# Patient Record
Sex: FEMALE | Race: WHITE
[De-identification: names, ages, dates, MRNs, and addresses within clinical notes are randomized per-mention and may not be internally consistent; named-entity substitution may affect disease eponyms.]

---

## 2018-01-07 ENCOUNTER — HOSPITAL ENCOUNTER (OUTPATIENT)
Dept: HOSPITAL 84 - D.ER | Age: 73
Setting detail: OBSERVATION
LOS: 1 days | Discharge: HOME | End: 2018-01-08
Attending: FAMILY MEDICINE | Admitting: FAMILY MEDICINE
Payer: MEDICARE

## 2018-01-07 VITALS — DIASTOLIC BLOOD PRESSURE: 53 MMHG | SYSTOLIC BLOOD PRESSURE: 129 MMHG

## 2018-01-07 VITALS — DIASTOLIC BLOOD PRESSURE: 52 MMHG | SYSTOLIC BLOOD PRESSURE: 151 MMHG

## 2018-01-07 VITALS — DIASTOLIC BLOOD PRESSURE: 60 MMHG | SYSTOLIC BLOOD PRESSURE: 132 MMHG

## 2018-01-07 VITALS — SYSTOLIC BLOOD PRESSURE: 134 MMHG | DIASTOLIC BLOOD PRESSURE: 58 MMHG

## 2018-01-07 VITALS — DIASTOLIC BLOOD PRESSURE: 49 MMHG | SYSTOLIC BLOOD PRESSURE: 134 MMHG

## 2018-01-07 VITALS — BODY MASS INDEX: 26.29 KG/M2 | BODY MASS INDEX: 26.29 KG/M2 | WEIGHT: 154 LBS | HEIGHT: 64 IN

## 2018-01-07 VITALS — DIASTOLIC BLOOD PRESSURE: 58 MMHG | SYSTOLIC BLOOD PRESSURE: 133 MMHG

## 2018-01-07 DIAGNOSIS — K21.9: ICD-10-CM

## 2018-01-07 DIAGNOSIS — E78.5: ICD-10-CM

## 2018-01-07 DIAGNOSIS — F32.89: ICD-10-CM

## 2018-01-07 DIAGNOSIS — G25.0: ICD-10-CM

## 2018-01-07 DIAGNOSIS — I10: ICD-10-CM

## 2018-01-07 DIAGNOSIS — J44.1: Primary | ICD-10-CM

## 2018-01-07 LAB
ALBUMIN SERPL-MCNC: 3.7 G/DL (ref 3.4–5)
ALP SERPL-CCNC: 92 U/L (ref 46–116)
ALT SERPL-CCNC: 24 U/L (ref 10–68)
ANION GAP SERPL CALC-SCNC: 16.5 MMOL/L (ref 8–16)
BASOPHILS NFR BLD AUTO: 0.4 % (ref 0–2)
BILIRUB SERPL-MCNC: 0.27 MG/DL (ref 0.2–1.3)
BUN SERPL-MCNC: 26 MG/DL (ref 7–18)
CALCIUM SERPL-MCNC: 8.6 MG/DL (ref 8.5–10.1)
CHLORIDE SERPL-SCNC: 104 MMOL/L (ref 98–107)
CO2 SERPL-SCNC: 26 MMOL/L (ref 21–32)
CREAT SERPL-MCNC: 1.8 MG/DL (ref 0.6–1.3)
EOSINOPHIL NFR BLD: 0.7 % (ref 0–7)
ERYTHROCYTE [DISTWIDTH] IN BLOOD BY AUTOMATED COUNT: 14 % (ref 11.5–14.5)
GLOBULIN SER-MCNC: 3.6 G/L
GLUCOSE SERPL-MCNC: 106 MG/DL (ref 74–106)
HCT VFR BLD CALC: 39.3 % (ref 36–48)
HGB BLD-MCNC: 12.9 G/DL (ref 12–16)
IMM GRANULOCYTES NFR BLD: 0.3 % (ref 0–5)
LYMPHOCYTES NFR BLD AUTO: 14.6 % (ref 15–50)
MCH RBC QN AUTO: 28.8 PG (ref 26–34)
MCHC RBC AUTO-ENTMCNC: 32.8 G/DL (ref 31–37)
MCV RBC: 87.7 FL (ref 80–100)
MONOCYTES NFR BLD: 13.8 % (ref 2–11)
NEUTROPHILS NFR BLD AUTO: 70.2 % (ref 40–80)
NT-PROBNP SERPL-MCNC: 167 PG/ML (ref 0–125)
OSMOLALITY SERPL CALC.SUM OF ELEC: 289 MOSM/KG (ref 275–300)
PLATELET # BLD: 189 10X3/UL (ref 130–400)
PMV BLD AUTO: 10.2 FL (ref 7.4–10.4)
POTASSIUM SERPL-SCNC: 3.5 MMOL/L (ref 3.5–5.1)
PROT SERPL-MCNC: 7.3 G/DL (ref 6.4–8.2)
RBC # BLD AUTO: 4.48 10X6/UL (ref 4–5.4)
SODIUM SERPL-SCNC: 143 MMOL/L (ref 136–145)
TROPONIN I SERPL-MCNC: < 0.017 NG/ML (ref 0–0.06)
WBC # BLD AUTO: 6.7 10X3/UL (ref 4.8–10.8)

## 2018-01-08 VITALS — DIASTOLIC BLOOD PRESSURE: 54 MMHG | SYSTOLIC BLOOD PRESSURE: 141 MMHG

## 2018-01-08 VITALS — SYSTOLIC BLOOD PRESSURE: 129 MMHG | DIASTOLIC BLOOD PRESSURE: 55 MMHG

## 2018-01-08 VITALS — DIASTOLIC BLOOD PRESSURE: 58 MMHG | SYSTOLIC BLOOD PRESSURE: 148 MMHG

## 2018-01-08 VITALS — SYSTOLIC BLOOD PRESSURE: 159 MMHG | DIASTOLIC BLOOD PRESSURE: 59 MMHG

## 2018-01-08 VITALS — DIASTOLIC BLOOD PRESSURE: 59 MMHG | SYSTOLIC BLOOD PRESSURE: 126 MMHG

## 2019-08-09 ENCOUNTER — HOSPITAL ENCOUNTER (INPATIENT)
Dept: HOSPITAL 84 - D.ER | Age: 74
LOS: 4 days | Discharge: NURSING, REHAB OR LONG-TERM CARE | DRG: 493 | End: 2019-08-13
Attending: FAMILY MEDICINE | Admitting: FAMILY MEDICINE
Payer: MEDICARE

## 2019-08-09 VITALS — HEIGHT: 64 IN | BODY MASS INDEX: 27.2 KG/M2 | WEIGHT: 159.33 LBS

## 2019-08-09 VITALS — DIASTOLIC BLOOD PRESSURE: 63 MMHG | SYSTOLIC BLOOD PRESSURE: 138 MMHG

## 2019-08-09 DIAGNOSIS — I10: ICD-10-CM

## 2019-08-09 DIAGNOSIS — I25.10: ICD-10-CM

## 2019-08-09 DIAGNOSIS — J44.9: ICD-10-CM
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
## 2019-08-09 DIAGNOSIS — K21.9: ICD-10-CM
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
## 2019-08-09 DIAGNOSIS — S82.142A: Primary | ICD-10-CM
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
## 2019-08-09 DIAGNOSIS — D62: ICD-10-CM

## 2019-08-09 LAB
ALBUMIN SERPL-MCNC: 3.7 G/DL (ref 3.4–5)
ALP SERPL-CCNC: 102 U/L (ref 46–116)
ALT SERPL-CCNC: 53 U/L (ref 10–68)
ANION GAP SERPL CALC-SCNC: 11.2 MMOL/L (ref 8–16)
APTT BLD: 25.5 SECONDS (ref 22.8–39.4)
BASOPHILS NFR BLD AUTO: 0.4 % (ref 0–2)
BILIRUB SERPL-MCNC: 0.5 MG/DL (ref 0.2–1.3)
BUN SERPL-MCNC: 38 MG/DL (ref 7–18)
CALCIUM SERPL-MCNC: 9 MG/DL (ref 8.5–10.1)
CHLORIDE SERPL-SCNC: 105 MMOL/L (ref 98–107)
CO2 SERPL-SCNC: 27.5 MMOL/L (ref 21–32)
CREAT SERPL-MCNC: 1.7 MG/DL (ref 0.6–1.3)
EOSINOPHIL NFR BLD: 1.1 % (ref 0–7)
ERYTHROCYTE [DISTWIDTH] IN BLOOD BY AUTOMATED COUNT: 14.4 % (ref 11.5–14.5)
GLOBULIN SER-MCNC: 3.1 G/L
GLUCOSE SERPL-MCNC: 94 MG/DL (ref 74–106)
HCT VFR BLD CALC: 34.5 % (ref 36–48)
HGB BLD-MCNC: 11.3 G/DL (ref 12–16)
IMM GRANULOCYTES NFR BLD: 0.3 % (ref 0–5)
INR PPP: 1.02 (ref 0.85–1.17)
LYMPHOCYTES NFR BLD AUTO: 16.2 % (ref 15–50)
MCH RBC QN AUTO: 29.1 PG (ref 26–34)
MCHC RBC AUTO-ENTMCNC: 32.8 G/DL (ref 31–37)
MCV RBC: 88.9 FL (ref 80–100)
MONOCYTES NFR BLD: 8.1 % (ref 2–11)
NEUTROPHILS NFR BLD AUTO: 73.9 % (ref 40–80)
OSMOLALITY SERPL CALC.SUM OF ELEC: 287 MOSM/KG (ref 275–300)
PLATELET # BLD: 195 10X3/UL (ref 130–400)
PMV BLD AUTO: 9.7 FL (ref 7.4–10.4)
POTASSIUM SERPL-SCNC: 3.7 MMOL/L (ref 3.5–5.1)
PROT SERPL-MCNC: 6.8 G/DL (ref 6.4–8.2)
PROTHROMBIN TIME: 12.9 SECONDS (ref 11.6–15)
RBC # BLD AUTO: 3.88 10X6/UL (ref 4–5.4)
SODIUM SERPL-SCNC: 140 MMOL/L (ref 136–145)
WBC # BLD AUTO: 11.1 10X3/UL (ref 4.8–10.8)

## 2019-08-10 VITALS — SYSTOLIC BLOOD PRESSURE: 118 MMHG | DIASTOLIC BLOOD PRESSURE: 68 MMHG

## 2019-08-10 VITALS — SYSTOLIC BLOOD PRESSURE: 120 MMHG | DIASTOLIC BLOOD PRESSURE: 50 MMHG

## 2019-08-10 VITALS — DIASTOLIC BLOOD PRESSURE: 48 MMHG | SYSTOLIC BLOOD PRESSURE: 124 MMHG

## 2019-08-10 VITALS — DIASTOLIC BLOOD PRESSURE: 50 MMHG | SYSTOLIC BLOOD PRESSURE: 129 MMHG

## 2019-08-10 LAB
ALBUMIN SERPL-MCNC: 3.4 G/DL (ref 3.4–5)
ALP SERPL-CCNC: 98 U/L (ref 46–116)
ALT SERPL-CCNC: 57 U/L (ref 10–68)
ANION GAP SERPL CALC-SCNC: 16.4 MMOL/L (ref 8–16)
BASOPHILS NFR BLD AUTO: 0.2 % (ref 0–2)
BILIRUB SERPL-MCNC: 0.73 MG/DL (ref 0.2–1.3)
BUN SERPL-MCNC: 30 MG/DL (ref 7–18)
CALCIUM SERPL-MCNC: 8.7 MG/DL (ref 8.5–10.1)
CHLORIDE SERPL-SCNC: 106 MMOL/L (ref 98–107)
CO2 SERPL-SCNC: 21.8 MMOL/L (ref 21–32)
CREAT SERPL-MCNC: 1.4 MG/DL (ref 0.6–1.3)
EOSINOPHIL NFR BLD: 0.7 % (ref 0–7)
ERYTHROCYTE [DISTWIDTH] IN BLOOD BY AUTOMATED COUNT: 14.3 % (ref 11.5–14.5)
GLOBULIN SER-MCNC: 3.2 G/L
GLUCOSE SERPL-MCNC: 114 MG/DL (ref 74–106)
HCT VFR BLD CALC: 33.4 % (ref 36–48)
HGB BLD-MCNC: 10.9 G/DL (ref 12–16)
IMM GRANULOCYTES NFR BLD: 0.2 % (ref 0–5)
LYMPHOCYTES NFR BLD AUTO: 15.5 % (ref 15–50)
MCH RBC QN AUTO: 28.8 PG (ref 26–34)
MCHC RBC AUTO-ENTMCNC: 32.6 G/DL (ref 31–37)
MCV RBC: 88.1 FL (ref 80–100)
MONOCYTES NFR BLD: 10.7 % (ref 2–11)
NEUTROPHILS NFR BLD AUTO: 72.7 % (ref 40–80)
OSMOLALITY SERPL CALC.SUM OF ELEC: 285 MOSM/KG (ref 275–300)
PLATELET # BLD: 192 10X3/UL (ref 130–400)
PMV BLD AUTO: 10 FL (ref 7.4–10.4)
POTASSIUM SERPL-SCNC: 4.2 MMOL/L (ref 3.5–5.1)
PROT SERPL-MCNC: 6.6 G/DL (ref 6.4–8.2)
RBC # BLD AUTO: 3.79 10X6/UL (ref 4–5.4)
SODIUM SERPL-SCNC: 140 MMOL/L (ref 136–145)
WBC # BLD AUTO: 8.9 10X3/UL (ref 4.8–10.8)

## 2019-08-10 PROCEDURE — 0QSH04Z REPOSITION LEFT TIBIA WITH INTERNAL FIXATION DEVICE, OPEN APPROACH: ICD-10-PCS | Performed by: ORTHOPAEDIC SURGERY

## 2019-08-10 NOTE — NUR
PT VERY UPSET EARLIER AND STATED SHE WAS NOT GETTING ANY RELIEF FROM HER
MORPHINE PCA. AFTER ONE HOUR OF NO RELIEF WE CALL [facility] FOR DR SEGAL AND SHE
DC'D THE MORPHINE PCA AND ORDERED DILAUDID IV EVERY 6 HRS. WHEN THIS WAS TOLD
TO THE PATIENT SHE STATED THAT THE MORPHINE HAD FINALLY KICKED IN AND SHE WAS
DOING BETTER AND DID NOT WANT TO CHANGE HER MEDS AT THIS TIME. WILL LET THE MD
KNOW IN THE AM.

## 2019-08-10 NOTE — NUR
RECEIVED PT FROM OR VIA STRETCHER. DROWSY. RESP EVEN AND NONLABORED. O2 @
3LNC. BED ALARM ON FOR PT SAFETY. DRSG NOTED TO LLE WITH IMMOBILIZER IN USE.
LT PEDAL PULSE WEAKER THATN RT. 1/2 NS @ 50 ML/HR INFUSINGIN RT WRIST.
MORPHINE PCA IN USE. V/S STABLE. SR ELEVATED X2. CL IN REACH.

## 2019-08-11 VITALS — SYSTOLIC BLOOD PRESSURE: 115 MMHG | DIASTOLIC BLOOD PRESSURE: 34 MMHG

## 2019-08-11 VITALS — DIASTOLIC BLOOD PRESSURE: 38 MMHG | SYSTOLIC BLOOD PRESSURE: 97 MMHG

## 2019-08-11 VITALS — DIASTOLIC BLOOD PRESSURE: 42 MMHG | SYSTOLIC BLOOD PRESSURE: 116 MMHG

## 2019-08-11 VITALS — SYSTOLIC BLOOD PRESSURE: 114 MMHG | DIASTOLIC BLOOD PRESSURE: 54 MMHG

## 2019-08-11 VITALS — SYSTOLIC BLOOD PRESSURE: 109 MMHG | DIASTOLIC BLOOD PRESSURE: 63 MMHG

## 2019-08-11 VITALS — DIASTOLIC BLOOD PRESSURE: 78 MMHG | SYSTOLIC BLOOD PRESSURE: 128 MMHG

## 2019-08-11 LAB
ALBUMIN SERPL-MCNC: 2.8 G/DL (ref 3.4–5)
ALP SERPL-CCNC: 84 U/L (ref 46–116)
ALT SERPL-CCNC: 45 U/L (ref 10–68)
ANION GAP SERPL CALC-SCNC: 13.8 MMOL/L (ref 8–16)
BASOPHILS NFR BLD AUTO: 0 % (ref 0–2)
BILIRUB SERPL-MCNC: 0.61 MG/DL (ref 0.2–1.3)
BUN SERPL-MCNC: 26 MG/DL (ref 7–18)
CALCIUM SERPL-MCNC: 8.2 MG/DL (ref 8.5–10.1)
CHLORIDE SERPL-SCNC: 106 MMOL/L (ref 98–107)
CO2 SERPL-SCNC: 25.8 MMOL/L (ref 21–32)
CREAT SERPL-MCNC: 1.5 MG/DL (ref 0.6–1.3)
EOSINOPHIL NFR BLD: 0 % (ref 0–7)
ERYTHROCYTE [DISTWIDTH] IN BLOOD BY AUTOMATED COUNT: 14.5 % (ref 11.5–14.5)
GLOBULIN SER-MCNC: 3.2 G/L
GLUCOSE SERPL-MCNC: 114 MG/DL (ref 74–106)
HCT VFR BLD CALC: 31.3 % (ref 36–48)
HGB BLD-MCNC: 10.1 G/DL (ref 12–16)
IMM GRANULOCYTES NFR BLD: 0.2 % (ref 0–5)
LYMPHOCYTES NFR BLD AUTO: 5.8 % (ref 15–50)
MCH RBC QN AUTO: 28.8 PG (ref 26–34)
MCHC RBC AUTO-ENTMCNC: 32.3 G/DL (ref 31–37)
MCV RBC: 89.2 FL (ref 80–100)
MONOCYTES NFR BLD: 8.7 % (ref 2–11)
NEUTROPHILS NFR BLD AUTO: 85.3 % (ref 40–80)
OSMOLALITY SERPL CALC.SUM OF ELEC: 286 MOSM/KG (ref 275–300)
PLATELET # BLD: 200 10X3/UL (ref 130–400)
PMV BLD AUTO: 10.2 FL (ref 7.4–10.4)
POTASSIUM SERPL-SCNC: 4.6 MMOL/L (ref 3.5–5.1)
PROT SERPL-MCNC: 6 G/DL (ref 6.4–8.2)
RBC # BLD AUTO: 3.51 10X6/UL (ref 4–5.4)
SODIUM SERPL-SCNC: 141 MMOL/L (ref 136–145)
WBC # BLD AUTO: 9.4 10X3/UL (ref 4.8–10.8)

## 2019-08-11 NOTE — NUR
PT ALERT AND ORIENTED WHEN ENTERING THE ROOM. LEFT LOWER LEG WITH CAST AND
IMMOBILIZER. IN PLACE. PILLOW UNDER AND STABLIZED. STATES PAIN IS TOLERABLE AT
THIS TIME. HAS LEFT WRIST IV THAT IS PATENT AND INFUSING 0.45 NS AS ORDERED.
ASSISTED WITH BED AIRTA. PT HAS TRAPEZE BAR AND USES WELL. DENIES FURTHER
ISSUES. CPOC

## 2019-08-11 NOTE — NUR
PT RESTING IN BED. CO OF PAIN 10/10. OXY 10 MG GIVEN FOR PAIN. NO S/S OF ACUTE
DISTRESS. CL IN PLACE.

## 2019-08-11 NOTE — OP
PATIENT NAME:  MARTELL WATSON                               MEDICAL RECORD: [id number]
:[birth year]                                             LOCATION:D.MS [id number]
                                                         ADMISSION DATE:19
SURGEON:  RICCI BATES DO         
 
 
DATE OF OPERATION:  08/10/2019
 
PROCEDURE PERFORMED:  Left lateral tibial plateau open reduction internal
fixation.
 
PREOPERATIVE DIAGNOSIS:  Displaced split depression of the left lateral tibial
plateau.
 
POSTOPERATIVE DIAGNOSIS:  Displaced split depression of the left lateral tibial
plateau.
 
INDICATIONS:  Ms. Watson is a 73-year-old female who was cleaning her ceiling
fan from her bed.  She slipped and fell and sustained a tibial plateau fracture.
 She was admitted to Medicine, [facility] overnight and she was aware of the
risks of this including infection, bleeding, damage to nerves and vessels,
failure of fixation, malunion, nonunion, and continued knee pain.  She is also
aware of the fact that she may most likely need a knee replacement due to the
traumatic nature of this to the joint line.  She was aware that as well as the
risk of blood clots and even death and signed the consent.
 
SURGEON:  Ricci Bates DO
 
PROCEDURE:  The patient was taken to the operative suite, laid in supine
position, given general anesthetic and 2 grams of Ancef preoperatively.  The
left lower extremity was then prepped and draped in sterile fashion.  Tourniquet
was under the drapes.  A timeout was performed and everyone was agreeance with
the correct side, site, patient and procedure.  The Esmarch was then used to
exsanguinate the left lower extremity.  The tourniquet was inflated to 350 mmHg,
was up for 70 minutes.  The incision then began over the lateral aspect of the
tibia in a hockey stick type incision and careful dissection was made down to
the IT band.  The IT band was split and the tibia was cleaned off for a place
for the plate.  Reduction was made and tamp was used to tamp up the depressed
part of the tibial plateau on the lateral side.  Once this was completed, the
plate was placed, and once he is in satisfactory position, AP and lateral screws
were entered first a kickstand screw and then proximal screws and then more
distal screws and the kickstand screw was replaced for a locking screw.  This
was confirmed to be in good position on AP and lateral x-ray.  The tourniquet
was then let down and bleeding was coagulated.  The incision was then closed. 
The deep tissue, fascia was closed with #1 Vicryl in a figure-of-eight fashion
and then the skin was closed with 2-0 Vicryl in inverted interrupted fashion and
ZipLine was placed on the knee.  Adaptic, 4 x 4s, ABD, Webril, and Ace wrap was
then used to dress the wound.  A ANGIE hose stocking up to the knee and the knee
immobilizer placed on the patient.  She was awakened and taken to recovery in
stable condition.  Blood loss approximately 100 mL.
 
COMPLICATIONS:  None.
 
TRANSINT:[id number] Voice Confirmation ID: [id number] DOCUMENT ID: [id number]
 
 
 
OPERATIVE REPORT                               [id number]    MARTELL WATSON,RICCI LIU DO         
 
 
 
Electronically Signed by RICCI LEAVITT on 19 at 0701
 
 
 
 
 
 
 
 
 
 
 
 
 
 
 
 
 
 
 
 
 
 
 
 
 
 
 
 
 
 
 
 
 
 
 
 
 
 
 
 
 
CC:                                                             [id number]
DICTATION DATE: 08/10/19 2042     :     08/10/19 2315      ADM IN  
                                                                              
[facility]                                          
[address] [place] [address]

## 2019-08-11 NOTE — NUR
ASSISTED ONTO BEDPAN TO VOID. DOING WELL. USED PCA FOR C/O PAIN IN LLE. V/S
STABLE. RESP EVEN AND NONLABORED. BED ALARM IN USE FOR PT SAFETY. CL IN REACH.

## 2019-08-11 NOTE — NUR
PT RESTING IN BED. WATCHING TV. PAIN 2/10. PCA EDUCATION DONE. NO S/S OF ACUTE
DISTRESS. CL IN PLACE.

## 2019-08-12 VITALS — DIASTOLIC BLOOD PRESSURE: 48 MMHG | SYSTOLIC BLOOD PRESSURE: 102 MMHG

## 2019-08-12 VITALS — SYSTOLIC BLOOD PRESSURE: 148 MMHG | DIASTOLIC BLOOD PRESSURE: 52 MMHG

## 2019-08-12 VITALS — SYSTOLIC BLOOD PRESSURE: 100 MMHG | DIASTOLIC BLOOD PRESSURE: 46 MMHG

## 2019-08-12 VITALS — SYSTOLIC BLOOD PRESSURE: 108 MMHG | DIASTOLIC BLOOD PRESSURE: 46 MMHG

## 2019-08-12 VITALS — DIASTOLIC BLOOD PRESSURE: 52 MMHG | SYSTOLIC BLOOD PRESSURE: 135 MMHG

## 2019-08-12 VITALS — SYSTOLIC BLOOD PRESSURE: 105 MMHG | DIASTOLIC BLOOD PRESSURE: 70 MMHG

## 2019-08-12 LAB
ALBUMIN SERPL-MCNC: 2.6 G/DL (ref 3.4–5)
ALP SERPL-CCNC: 81 U/L (ref 46–116)
ALT SERPL-CCNC: 24 U/L (ref 10–68)
ANION GAP SERPL CALC-SCNC: 9.2 MMOL/L (ref 8–16)
APPEARANCE UR: CLEAR
BACTERIA #/AREA URNS HPF: (no result) /HPF
BASOPHILS NFR BLD AUTO: 0.2 % (ref 0–2)
BILIRUB SERPL-MCNC: 0.48 MG/DL (ref 0.2–1.3)
BILIRUB SERPL-MCNC: NEGATIVE MG/DL
BUN SERPL-MCNC: 23 MG/DL (ref 7–18)
CALCIUM SERPL-MCNC: 7.5 MG/DL (ref 8.5–10.1)
CHLORIDE SERPL-SCNC: 106 MMOL/L (ref 98–107)
CO2 SERPL-SCNC: 28.3 MMOL/L (ref 21–32)
COLOR UR: YELLOW
CREAT SERPL-MCNC: 1.4 MG/DL (ref 0.6–1.3)
EOSINOPHIL NFR BLD: 1.1 % (ref 0–7)
ERYTHROCYTE [DISTWIDTH] IN BLOOD BY AUTOMATED COUNT: 14.4 % (ref 11.5–14.5)
FERRITIN SERPL-MCNC: 201 NG/ML (ref 3–244)
GLOBULIN SER-MCNC: 3.1 G/L
GLUCOSE SERPL-MCNC: 91 MG/DL (ref 74–106)
GLUCOSE SERPL-MCNC: NEGATIVE MG/DL
HCT VFR BLD CALC: 27.8 % (ref 36–48)
HGB BLD-MCNC: 9 G/DL (ref 12–16)
IMM GRANULOCYTES NFR BLD: 0.5 % (ref 0–5)
IRON SERPL-MCNC: 15 UG/DL (ref 35–150)
KETONES UR STRIP-MCNC: NEGATIVE MG/DL
LDH1 SERPL-CCNC: 194 U/L (ref 81–234)
LYMPHOCYTES NFR BLD AUTO: 13.9 % (ref 15–50)
MCH RBC QN AUTO: 28.9 PG (ref 26–34)
MCHC RBC AUTO-ENTMCNC: 32.4 G/DL (ref 31–37)
MCV RBC: 89.4 FL (ref 80–100)
MONOCYTES NFR BLD: 11.2 % (ref 2–11)
NEUTROPHILS NFR BLD AUTO: 73.1 % (ref 40–80)
NITRITE UR-MCNC: NEGATIVE MG/ML
OSMOLALITY SERPL CALC.SUM OF ELEC: 282 MOSM/KG (ref 275–300)
PH UR STRIP: 5 [PH] (ref 5–6)
PLATELET # BLD: 182 10X3/UL (ref 130–400)
PMV BLD AUTO: 10.2 FL (ref 7.4–10.4)
POTASSIUM SERPL-SCNC: 3.5 MMOL/L (ref 3.5–5.1)
PROT SERPL-MCNC: 5.7 G/DL (ref 6.4–8.2)
PROT UR-MCNC: NEGATIVE MG/DL
RBC # BLD AUTO: 3.11 10X6/UL (ref 4–5.4)
RBC #/AREA URNS HPF: (no result) /HPF (ref 0–5)
SAO2 % BLD FROM PO2: 8 % (ref 15–55)
SODIUM SERPL-SCNC: 140 MMOL/L (ref 136–145)
SP GR UR STRIP: 1.01 (ref 1–1.02)
TIBC SERPL-MCNC: 182 UG/DL (ref 260–445)
UIBC SERPL-MCNC: 167 UG/DL (ref 150–375)
UROBILINOGEN UR-MCNC: NORMAL MG/DL
WBC # BLD AUTO: 9.7 10X3/UL (ref 4.8–10.8)
WBC #/AREA URNS HPF: (no result) /HPF (ref 0–5)

## 2019-08-12 NOTE — NUR
ALERT AND ORIENTED. LUNGS CLEAR BILATERALLY IN ALL FIELDS. HEART SOUNDS S1 AND
S2 HEARD IN ALL FIELDS. BOWEL SOUNDS SLUGGISH X 4. IMMOBILIZER IN PLACE TO
LLE. DENIES PAIN. DENIES NEEDS. BED LOW. FALL PRECAUTIONS IN PLACE. CALL BELL
AND PERSONAL ITEMS INREACH. WILL CONTINUE TO MONITOR.

## 2019-08-12 NOTE — NUR
Rehab Note- Acute Inpatient Rehab prescreen order received. Visited with the
patient. She is in agreeance with [facility] prior to being
discharged home. Will accept the patient to [facility] when
medically stable and ready for discharge form the acute hospital. Spoke with
CECIL Shelton. Thank you for this referral!
Anh Cartagena RN
Clinical Liaison, [facility]

## 2019-08-12 NOTE — NUR
MEPILEX APPLIED TO LEFT OUTTER KNEE INCISION PER DRSG CHANGE ORDER. ACE WRAP
APPLIED. NEW BRACE PLACED ON LLE PER ORDER.

## 2019-08-12 NOTE — NUR
SLIGHT INCREASE IN FEVER AT 99.4. PROVIDED INCENTIVE SPIROMETER PER DR. BATES STANDING ORDER. INSTRUCTED PATIENT TO USE SEVERAL TIMES WITHIN HOUR TO
PROMOTE DEEP BREATHING AND DECREASE IN BODY TEMPERATURE. PATIENT PERFORMS FOR
THIS NURSE AND DOES SO EFFECTIVELY.

## 2019-08-12 NOTE — NUR
PT SITTING UP IN BED WITHOUT DISTRESS, ALERT AND ORIENTED. IV LEFT WRIST
INFUSING 1/2NS @ 50. LEFT LEG IN BRACE, DRESSING CDI. DENIES PAIN. FALL
PRECAUTIONS IN PLACE, BED ALARM ON. DENIES NEEDS AT THIS TIME. CL IN REACH,
WILL CTM

## 2019-08-12 NOTE — NUR
PT TEMPERATURE NOW 98.9. INCENTIVE SPIROMETER REMAINS AT BEDSIDE AND
ENCOURAGED PATIENT TO CONTINUE TO USE THROUGH OUT DAY TO PROMOTE FEVER
PROPHYLAXIS. PATIENT VERBALIZES UNDERSTANDING.

## 2019-08-13 ENCOUNTER — HOSPITAL ENCOUNTER (INPATIENT)
Dept: HOSPITAL 84 - D.REHAB | Age: 74
LOS: 14 days | Discharge: NURSING, REHAB OR LONG-TERM CARE | DRG: 560 | End: 2019-08-27
Attending: FAMILY MEDICINE | Admitting: FAMILY MEDICINE
Payer: MEDICARE

## 2019-08-13 VITALS
HEIGHT: 64 IN | BODY MASS INDEX: 26.63 KG/M2 | BODY MASS INDEX: 26.63 KG/M2 | BODY MASS INDEX: 26.63 KG/M2 | WEIGHT: 156 LBS | WEIGHT: 156 LBS | HEIGHT: 64 IN

## 2019-08-13 VITALS — SYSTOLIC BLOOD PRESSURE: 137 MMHG | DIASTOLIC BLOOD PRESSURE: 71 MMHG

## 2019-08-13 VITALS — SYSTOLIC BLOOD PRESSURE: 134 MMHG | DIASTOLIC BLOOD PRESSURE: 56 MMHG

## 2019-08-13 VITALS — DIASTOLIC BLOOD PRESSURE: 64 MMHG | SYSTOLIC BLOOD PRESSURE: 112 MMHG

## 2019-08-13 VITALS — DIASTOLIC BLOOD PRESSURE: 46 MMHG | SYSTOLIC BLOOD PRESSURE: 144 MMHG

## 2019-08-13 VITALS — SYSTOLIC BLOOD PRESSURE: 151 MMHG | DIASTOLIC BLOOD PRESSURE: 67 MMHG

## 2019-08-13 DIAGNOSIS — S82.142D: Primary | ICD-10-CM

## 2019-08-13 DIAGNOSIS — I10: ICD-10-CM

## 2019-08-13 DIAGNOSIS — D62: ICD-10-CM

## 2019-08-13 DIAGNOSIS — Z87.891: ICD-10-CM
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
## 2019-08-13 DIAGNOSIS — H91.90: ICD-10-CM

## 2019-08-13 DIAGNOSIS — K59.09: ICD-10-CM

## 2019-08-13 DIAGNOSIS — K21.9: ICD-10-CM

## 2019-08-13 DIAGNOSIS — J44.9: ICD-10-CM

## 2019-08-13 LAB
ALBUMIN SERPL-MCNC: 2.4 G/DL (ref 3.4–5)
ALP SERPL-CCNC: 101 U/L (ref 46–116)
ALT SERPL-CCNC: 13 U/L (ref 10–68)
ANION GAP SERPL CALC-SCNC: 11.2 MMOL/L (ref 8–16)
BILIRUB SERPL-MCNC: 0.63 MG/DL (ref 0.2–1.3)
BUN SERPL-MCNC: 17 MG/DL (ref 7–18)
CALCIUM SERPL-MCNC: 7.6 MG/DL (ref 8.5–10.1)
CHLORIDE SERPL-SCNC: 107 MMOL/L (ref 98–107)
CO2 SERPL-SCNC: 27 MMOL/L (ref 21–32)
CREAT SERPL-MCNC: 1.4 MG/DL (ref 0.6–1.3)
ERYTHROCYTE [DISTWIDTH] IN BLOOD BY AUTOMATED COUNT: 13.6 % (ref 11.5–14.5)
GLOBULIN SER-MCNC: 3.4 G/L
GLUCOSE SERPL-MCNC: 95 MG/DL (ref 74–106)
HCT VFR BLD CALC: 27.1 % (ref 36–48)
HGB BLD-MCNC: 9.3 G/DL (ref 12–16)
LYMPHOCYTES NFR BLD AUTO: 16.3 % (ref 15–50)
MCH RBC QN AUTO: 30.4 PG (ref 26–34)
MCHC RBC AUTO-ENTMCNC: 34.3 G/DL (ref 31–37)
MCV RBC: 88.6 FL (ref 80–100)
NEUTROPHILS NFR BLD AUTO: 73.2 % (ref 40–80)
OSMOLALITY SERPL CALC.SUM OF ELEC: 284 MOSM/KG (ref 275–300)
PLATELET # BLD: 196 10X3/UL (ref 130–400)
PMV BLD AUTO: 10.1 FL (ref 7.4–10.4)
POTASSIUM SERPL-SCNC: 3.2 MMOL/L (ref 3.5–5.1)
PROT SERPL-MCNC: 5.8 G/DL (ref 6.4–8.2)
RBC # BLD AUTO: 3.06 10X6/UL (ref 4–5.4)
SODIUM SERPL-SCNC: 142 MMOL/L (ref 136–145)
WBC # BLD AUTO: 8.5 10X3/UL (ref 4.8–10.8)

## 2019-08-13 NOTE — NUR
DISCHARGE EDUCATION PROVIDED BOTH WRITTEN AND VERBAL. VERBALIZED
UNDERSTANDING. DENIES FURTHER QUESTIONS. WAITING FOR BED IN INPATIENT REHAB.
WILL CALL REPORT WHEN BED AVAILABLE.

## 2019-08-13 NOTE — NUR
REPORT CALLED TO REHAB RN. DENIES FURTHER QUESTIONS. IV REMOVED FROM LFA WITH
TIP INTACT. PATIENT DISCHARGED TO REHAB WITH ALL BELONGINGS.

## 2019-08-13 NOTE — MORECARE
CASE MANAGEMENT DISCHARGE SUMMARY
 
 
PATIENT: MARTELL WATSON                         UNIT: [id number]
ACCOUNT#: [id number]                       ADM DATE: 19
AGE: 73     : [birth year]  SEX: F            ROOM/BED: D.2211    
AUTHOR: TEDDYDOC                             PHYSICIAN:                               
 
REFERRING PHYSICIAN: THOM SEGAL MD               
DATE OF SERVICE: 19
Discharge Plan
 
 
Patient Name: MARTELL WATSON
Facility: [facility]:[place]
Encounter #: [id number]
Medical Record #: [id number]
: 1945
Planned Disposition: Inpatient Rehab
Anticipated Discharge Date: 
 
Discharge Date: 
Expected LOS: 
Initial Reviewer: [email or web address]
Initial Review Date: 2019
Generated: 19  10:48 am 
Comments
 
DCP- Discharge Planning
 
Updated by [email or web address]: Mary Carlson on 19   8:47 am CT
Patient Name: MARTELL WATSON                                     
Admission Status: ER   
Accout number: [id number]                              
Admission Date: 2019   
: 1945                                                        
Admission Diagnosis:   
Attending: THOM SEGAL                                                
Current LOS:  4   
  
Anticipated DC Date:    
Planned Disposition: Inpatient Rehab   
Primary Insurance: MEDICARE A & B   
  
  
Discharge Planning Comments:   
  
CM met with patient to complete initial dc planning assessment.  CM educated 
patient on the CM role and verbal consent given by patient to complete 
assessment.   Patient lives at home where she lives alone and states that she 
 
is independent with her care .  At discharge patient plans to go to inpatient 
rehab at [facility]  and feels this is a safe discharge.  CM discussed availability 
of home health, rehab services, and medical equipment. She has a walker at 
home and a boot for her lymphedema.  Patient denied known discharge needs at 
this time. IMM served and explained, copy given to her.  CM will continue to 
follow and will assist as needed with dc plans/needs.    
  
  
  
  
: Mary Carlson
 DCPIA - Discharge Planning Initial Assessment
 
Updated by [email or web address]: Mary Carlson on 19   9:41 am
*  Is the patient Alert and Oriented?
Yes
*  How many steps to enter\exit or inside your home?
 
*  PCP
DENY
*  Pharmacy
[facility]
*  Preadmission Environment
Home Alone
*  ADLs
Independent
*  Equipment
Rolling Walker
*  Other Equipment
SERRANO T FOR LYMPHEDEMA
*  Verbal permission to speak to the caregivers and representatives has been 
obtained from the patient.
N/A
*  Community resources currently utilized
None
*  Additional services required to return to the preadmission environment?
Yes
*  Can the patient safely return to the preadmission environment?
Yes
*  Has this patient been hospitalized within the prior 30 days at any 
hospital?
No
 
 
 
 
 
Coverage Notice
 
Reviewer: [id number] Catherine Hernandez Robyn
 
Notice Issued Date-Time: 2019   9:35
 
Notice Type: IM Discharge Notice
 
Notice Delivered To: Patient
Relationship to Patient: 
Representative Name: 
 
Delivery Method: HAND - Hand Delivered
Krystle Days:
Prior Verbal Notification: 
 
Recipient Understood Notice: Yes
Recipient Signature: Yes
Med Rec Note Co-signed by Attending:
 
Coverage Notice Comment:  
 
Last DP export: 19   8:40 a
Patient Name: MARTELL WATSON
Encounter #: [id number]
Page [phone]
 
 
 
 
 
Electronically Signed by REI ESPINAL on 19 at 0948
 
 
 
 
 
 
**All edits/amendments must be made on the electronic document**
 
DICTATION DATE: 19     : ERWIN  19     
RPT#: [id number]                                DC DATE:        
                                               STATUS: ADM IN  
[facility]
[address] [place] [address]
***END OF REPORT***

## 2019-08-13 NOTE — NUR
ALERT AND ORIENTED. LUNGS CLEAR BILATERALLY IN ALL FIELDS. HEART SOUNDS S1 AND
S2 HEARD IN ALL FIELDS. BOWEL SOUNDS ACTIVE X 4. SKIN INTACT WITHOUT REDNESS.
DRSG INTACT WITH ACE WRAP TO LEFT LEG. BRACE IN PLACE TO LEFT LEG LOCKED AT
0/0. DENIES PAIN. DENIES NEEDS. BED LOW. FALL PRECAUTIONS IN PLACE. CALL BELL
AND PERSONAL ITEMS IN REACH. WILL CONTINUE TO MONITOR.

## 2019-08-13 NOTE — NUR
ASSISTED PT ON AND OFF BEDPAN WITHOUT DIFFICULTY. PT ABLE TO LIFT BOTTOM UP
BY SELF. DENIES PAIN OR NEEDS AT THIS TIME. CL IN REACH, WILL CTM

## 2019-08-13 NOTE — NUR
PT HAS BRACE FOR LLE. SHE HAS PEDAL PULSES X2. SHE IS ALERT AND ORIENTED.
STATES SHE HURT HER RLE WHEN SHE WAS  YOUNG AND HER FOOT TURNS IN DUE TO THAT
ACCIDENT.

## 2019-08-13 NOTE — MORECARE
CASE MANAGEMENT DISCHARGE SUMMARY
 
 
PATIENT: MARTELL WATSON                         UNIT: [id number]
ACCOUNT#: [id number]                       ADM DATE: 19
AGE: 73     : [birth year]  SEX: F            ROOM/BED: D.2211    
AUTHOR: REI ESPINAL                             PHYSICIAN:                               
 
REFERRING PHYSICIAN: THOM SEGAL MD               
DATE OF SERVICE: 19
Discharge Plan
 
 
Patient Name: MARTELL WATSON
[facility]
Encounter #: [id number]
Medical Record #: [id number]
: 1945
Planned Disposition: Inpatient Rehab
Anticipated Discharge Date: 
 
Discharge Date: 
Expected LOS: 
Initial Reviewer: [id number]
Initial Review Date: 2019
Generated: 19  10:40 am 
  
 
 
 
 
 
 
 
Patient Name: MARTELL WATSON
 
Encounter #: [id number]
Page [phone]
 
 
 
 
 
Electronically Signed by REI ESPINAL on 19 at 0940
 
 
 
 
 
 
**All edits/amendments must be made on the electronic document**
 
DICTATION DATE: 19     : ERWIN  19     
RPT#: [id number]                                DC DATE:        
                                               STATUS: ADM IN  
[facility]
[address] [place] [address]
***END OF REPORT***

## 2019-08-13 NOTE — NUR
GREETED PATIENT AND INTRODUCED MYSELF. PATIENT IS LAYING IN BED WITH BRACE ON
LEFT LEG AND LEG ELEVATED ON PILLOW. STATES THAT HER PAIN LEVEL IS STILL A
5/10. DENIES ANY FURTHER NEEDS AT THIS TIME. CALL LIGHT IN REACH.

## 2019-08-13 NOTE — NUR
PT REQUESTED PAIN MEDICINE FOR "THROBBING" PAIN 8/10 IN LEFT LEG. GAVE OXY AS
ORDERED. DENIES OTHER NEEDS. CL IN REACH, WILL CTM

## 2019-08-14 VITALS — SYSTOLIC BLOOD PRESSURE: 121 MMHG | DIASTOLIC BLOOD PRESSURE: 51 MMHG

## 2019-08-14 VITALS — DIASTOLIC BLOOD PRESSURE: 47 MMHG | SYSTOLIC BLOOD PRESSURE: 127 MMHG

## 2019-08-14 LAB
ANION GAP SERPL CALC-SCNC: 13.2 MMOL/L (ref 8–16)
BASOPHILS NFR BLD AUTO: 0.5 % (ref 0–2)
BUN SERPL-MCNC: 16 MG/DL (ref 7–18)
CALCIUM SERPL-MCNC: 7.9 MG/DL (ref 8.5–10.1)
CHLORIDE SERPL-SCNC: 108 MMOL/L (ref 98–107)
CO2 SERPL-SCNC: 26.4 MMOL/L (ref 21–32)
CREAT SERPL-MCNC: 1.3 MG/DL (ref 0.6–1.3)
EOSINOPHIL NFR BLD: 2.3 % (ref 0–7)
ERYTHROCYTE [DISTWIDTH] IN BLOOD BY AUTOMATED COUNT: 14.2 % (ref 11.5–14.5)
GLUCOSE SERPL-MCNC: 96 MG/DL (ref 74–106)
HCT VFR BLD CALC: 27.2 % (ref 36–48)
HGB BLD-MCNC: 9.2 G/DL (ref 12–16)
IMM GRANULOCYTES NFR BLD: 0.2 % (ref 0–5)
LYMPHOCYTES NFR BLD AUTO: 15.5 % (ref 15–50)
MCH RBC QN AUTO: 29.6 PG (ref 26–34)
MCHC RBC AUTO-ENTMCNC: 33.8 G/DL (ref 31–37)
MCV RBC: 87.5 FL (ref 80–100)
MONOCYTES NFR BLD: 10.2 % (ref 2–11)
NEUTROPHILS NFR BLD AUTO: 71.3 % (ref 40–80)
OSMOLALITY SERPL CALC.SUM OF ELEC: 287 MOSM/KG (ref 275–300)
PLATELET # BLD: 242 10X3/UL (ref 130–400)
PMV BLD AUTO: 9.8 FL (ref 7.4–10.4)
POTASSIUM SERPL-SCNC: 3.6 MMOL/L (ref 3.5–5.1)
RBC # BLD AUTO: 3.11 10X6/UL (ref 4–5.4)
SODIUM SERPL-SCNC: 144 MMOL/L (ref 136–145)
WBC # BLD AUTO: 8.3 10X3/UL (ref 4.8–10.8)

## 2019-08-14 NOTE — NUR
PATIENT RESTING QUIETLY WITH EYES CLOSED. RESPIRATIONS EVEN. NO S/S OF
DISTRESS. SR UP X2. BED IN LOWEST POSITION. CALL LIGHT IN REACH.

## 2019-08-14 NOTE — NUR
ASSISTED FROM WHEELCHAIR TO BED. CL IN REACH. PT PREFORMED ORAL CARE BEFORE
BED. DENIES FURTHER NEEDS AT THIS TIME. BED IN LOW SIDE RAILS X2. LUNGS CLEAR.
BOWEL ACTIVE X4. PT SITTING UP IN BED. WILL CONTINUE TO MONITOR.

## 2019-08-15 VITALS — DIASTOLIC BLOOD PRESSURE: 58 MMHG | SYSTOLIC BLOOD PRESSURE: 130 MMHG

## 2019-08-15 VITALS — SYSTOLIC BLOOD PRESSURE: 112 MMHG | DIASTOLIC BLOOD PRESSURE: 52 MMHG

## 2019-08-15 NOTE — NUR
ASSISTED TO THE BATHROOM AGAIN DUE TO UPSET STOMACH AND PASSING GAS. PT HAD
LOOSE BM. PT ADMITS TO FEELING BETTER. PT BACK IN BED. DENIES FURTHER NEEDS.
WCTM CL IN REACH

## 2019-08-15 NOTE — NUR
ASSISTED TO AND FROM BATHROOM BY WHEELCHAIR. CL IN REACH. A/O X4. RESP EVEN
AND UNLABORED. LUNGS CLEAR. BOWEL ACTIVE X4. DENIES FURTHER NEEDS OR PAIN AT
THIS TIME. BRACE ON LEFT KNEE INTACT. PT BACK IN BED. LEFT LEG ELEVATED ON
PILLOW. WILL CONTINUE TO MONITOR.

## 2019-08-15 NOTE — NUR
ASSISTED PATIENT TO BATHROOM USING WHEELCHAIR. PATIENT BACK TO BED AND
REPOSITIONED FOR COMFORT. CALL LIGHT IN REACH.

## 2019-08-15 NOTE — NUR
ASSISTED TO BATHROOM. PT STILL COMPLAINING OF LOOSE STOOLS. BM NOTED. PT
ASSISTED BACK TO BED. CL IN REACH DENIES FURTHER NEEDS. WCTM

## 2019-08-15 NOTE — NUR
PT COMPLAINING OF BACK OF LEFT ANKLE HURTING AND THIS NURSE PUT A ICE PACK ON
IT AND PROPED IT UP OFF THE BED WITH A PILLOW AND PT GIVEN PAIN PILL PER MAR.
WILL CONTINUE TO MONITOR. CALL LIGHT IN REACH.

## 2019-08-16 VITALS — DIASTOLIC BLOOD PRESSURE: 56 MMHG | SYSTOLIC BLOOD PRESSURE: 140 MMHG

## 2019-08-16 VITALS — SYSTOLIC BLOOD PRESSURE: 113 MMHG | DIASTOLIC BLOOD PRESSURE: 52 MMHG

## 2019-08-16 LAB
ANION GAP SERPL CALC-SCNC: 13.9 MMOL/L (ref 8–16)
BASOPHILS NFR BLD AUTO: 0.4 % (ref 0–2)
BUN SERPL-MCNC: 28 MG/DL (ref 7–18)
CALCIUM SERPL-MCNC: 8.4 MG/DL (ref 8.5–10.1)
CHLORIDE SERPL-SCNC: 106 MMOL/L (ref 98–107)
CO2 SERPL-SCNC: 27.1 MMOL/L (ref 21–32)
CREAT SERPL-MCNC: 1.5 MG/DL (ref 0.6–1.3)
EOSINOPHIL NFR BLD: 1.8 % (ref 0–7)
ERYTHROCYTE [DISTWIDTH] IN BLOOD BY AUTOMATED COUNT: 14.1 % (ref 11.5–14.5)
GLUCOSE SERPL-MCNC: 104 MG/DL (ref 74–106)
HCT VFR BLD CALC: 29.5 % (ref 36–48)
HGB BLD-MCNC: 9.5 G/DL (ref 12–16)
IMM GRANULOCYTES NFR BLD: 0.4 % (ref 0–5)
LYMPHOCYTES NFR BLD AUTO: 17.9 % (ref 15–50)
MCH RBC QN AUTO: 28.7 PG (ref 26–34)
MCHC RBC AUTO-ENTMCNC: 32.2 G/DL (ref 31–37)
MCV RBC: 89.1 FL (ref 80–100)
MONOCYTES NFR BLD: 11.4 % (ref 2–11)
NEUTROPHILS NFR BLD AUTO: 68.1 % (ref 40–80)
OSMOLALITY SERPL CALC.SUM OF ELEC: 290 MOSM/KG (ref 275–300)
PLATELET # BLD: 288 10X3/UL (ref 130–400)
PMV BLD AUTO: 9.6 FL (ref 7.4–10.4)
POTASSIUM SERPL-SCNC: 4 MMOL/L (ref 3.5–5.1)
RBC # BLD AUTO: 3.31 10X6/UL (ref 4–5.4)
SODIUM SERPL-SCNC: 143 MMOL/L (ref 136–145)
WBC # BLD AUTO: 7.3 10X3/UL (ref 4.8–10.8)

## 2019-08-16 NOTE — NUR
BEDSIDE REPORT COMPLETE. SITTING UP IN BED WATCHING TV. DENIES ANY NEEDS OR
PAIN. NO SIGNS OF DISTRESS NOTED. CALL LIGHT AND WATER WITHIN REACH, FALL
PRECAUTIONS IN PLACE, BED WAIVER NOTED IN CHART. WILL CONTINUE TO MONITOR

## 2019-08-16 NOTE — NUR
QUIET HOURS. LYING IN BED SUPINE EYES CLOSED RESTING. RR EVEN AND UNLABORED.
WILL CONTINUE TO MONITOR

## 2019-08-16 NOTE — NUR
NUTRITION F/U
CHART REVIEWED, PT VISIT. REPORTS DRINKING ENSURE BID. 75% INTAKE RECENT MEALS
ON REG DIET. WILL CONTINUE TO HONOR FOOD PREFERENCES, MONITOR PO INTAKE.
RD FOLLOWING

## 2019-08-16 NOTE — NUR
SITTING UP IN BED EATING LUNCH. DENIES INCREASED PAIN TO LLE. HAS LEG BRACE IN
PLACE. PEDAL PULSES X2 PRESENT. CALL LIGHT IN REACH, BED IN LOWEST POSITION,
SIDE RAILS UP X2

## 2019-08-16 NOTE — NUR
LAYING QUIETLY IN BED RESTING WITH EYES CLOSED. DENIES PAIN. CALL LIGHT IN
REACH. BED IN LOWEST POSITION. SIDE RAILS UP X2.

## 2019-08-16 NOTE — NUR
PATIENT ADMITTED TO REHAB FROM ACUTE FLOOR. HER PCP IS DR. BARCLAY, DME AT HOME
IS A WALKER. DISCHARGE PLANS ARE FOR PATIENT TO RETURN TO HER HOME WITH HOME
HEALTH. WILL CONTINUE TO FOLLOW WITH PATIENT

## 2019-08-17 VITALS — DIASTOLIC BLOOD PRESSURE: 54 MMHG | SYSTOLIC BLOOD PRESSURE: 136 MMHG

## 2019-08-17 VITALS — SYSTOLIC BLOOD PRESSURE: 128 MMHG | DIASTOLIC BLOOD PRESSURE: 59 MMHG

## 2019-08-17 NOTE — NUR
BEDSIDE REPORT COMPLETE. SITTING UP IN BED ALERT AND ORIENTED X4. C/O LEFT
KNEE "STINGING" REMOVED DRESSING, INCISION ZIPTIED NO DRAINAGE, REDNESS OR
SWELLING NOTED. IRRITATION ON INNER LEG FROM ACE WRAP. PT STATED AFTER
REMOVING ACE WRAP KNEE FELT BETTER. WILL LEAVE OPEN TO AIR AND BRACE OFF FOR
AN HOUR WHILE IN BED. CALL LIGHT AND WATER WITHIN REACH, FALL PRECAUTIONS IN
PLACE, SIGNED BED ALARM WAIVER IN CHART. WILL CONTINUE TO [facility]

## 2019-08-18 VITALS — DIASTOLIC BLOOD PRESSURE: 52 MMHG | SYSTOLIC BLOOD PRESSURE: 123 MMHG

## 2019-08-18 VITALS — SYSTOLIC BLOOD PRESSURE: 137 MMHG | DIASTOLIC BLOOD PRESSURE: 40 MMHG

## 2019-08-18 NOTE — NUR
BEDSIDE REPORT COMPLETE. SITTING UP IN BED WATCHING TV. DENIES ANY NEEDS OR
PAIN. NO SIGNS OF DISTRESS NOTED. CALL LIGHT WITHIN REACH, FALL PRECAUTIONS IN
PLACE. WILL CONTINUE TO MONITOR

## 2019-08-18 NOTE — NUR
HAD SHOWER, NOW RESTING QUIETLY IN BED. APPETITE POOR AT PRESENT. HAD LARGE BM
JUST NOW. LLE LEG BRACE IN PLACE. CALL LIGHT IN REACH.

## 2019-08-18 NOTE — NUR
RESTING QUIETLY IN BED. HAS BEEN UP TO BATHROOM BUT STATES HER NECK HURTS HER
TODAY AND WANTED TO LAY BACK DOWN. LLE LEG BRACE IN IN PLACE. CALL LIGHT IN
REACH

## 2019-08-19 VITALS — SYSTOLIC BLOOD PRESSURE: 121 MMHG | DIASTOLIC BLOOD PRESSURE: 55 MMHG

## 2019-08-19 VITALS — DIASTOLIC BLOOD PRESSURE: 49 MMHG | SYSTOLIC BLOOD PRESSURE: 105 MMHG

## 2019-08-19 LAB
ANION GAP SERPL CALC-SCNC: 13.1 MMOL/L (ref 8–16)
BASOPHILS NFR BLD AUTO: 0.4 % (ref 0–2)
BUN SERPL-MCNC: 35 MG/DL (ref 7–18)
CALCIUM SERPL-MCNC: 9.1 MG/DL (ref 8.5–10.1)
CHLORIDE SERPL-SCNC: 102 MMOL/L (ref 98–107)
CO2 SERPL-SCNC: 28.8 MMOL/L (ref 21–32)
CREAT SERPL-MCNC: 1.5 MG/DL (ref 0.6–1.3)
EOSINOPHIL NFR BLD: 1.9 % (ref 0–7)
ERYTHROCYTE [DISTWIDTH] IN BLOOD BY AUTOMATED COUNT: 13.9 % (ref 11.5–14.5)
GLUCOSE SERPL-MCNC: 93 MG/DL (ref 74–106)
HCT VFR BLD CALC: 29.8 % (ref 36–48)
HGB BLD-MCNC: 9.6 G/DL (ref 12–16)
IMM GRANULOCYTES NFR BLD: 0.8 % (ref 0–5)
LYMPHOCYTES NFR BLD AUTO: 25 % (ref 15–50)
MCH RBC QN AUTO: 28.4 PG (ref 26–34)
MCHC RBC AUTO-ENTMCNC: 32.2 G/DL (ref 31–37)
MCV RBC: 88.2 FL (ref 80–100)
MONOCYTES NFR BLD: 16.7 % (ref 2–11)
NEUTROPHILS NFR BLD AUTO: 55.2 % (ref 40–80)
OSMOLALITY SERPL CALC.SUM OF ELEC: 286 MOSM/KG (ref 275–300)
PLATELET # BLD: 372 10X3/UL (ref 130–400)
PMV BLD AUTO: 9.4 FL (ref 7.4–10.4)
POTASSIUM SERPL-SCNC: 3.9 MMOL/L (ref 3.5–5.1)
RBC # BLD AUTO: 3.38 10X6/UL (ref 4–5.4)
SODIUM SERPL-SCNC: 140 MMOL/L (ref 136–145)
WBC # BLD AUTO: 5.2 10X3/UL (ref 4.8–10.8)

## 2019-08-19 NOTE — NUR
LAYING IN BED EATING SUPPER. STATES HER APPETITE IS BACK AND IS NOW HUNGRY.
STILL WEARING LLE LEG BRACE. CALL LIGHT IN REACH

## 2019-08-19 NOTE — NUR
QUIET HOURS. LYING IN BED SUPINE EYES CLOSED RESTING. NO SIGNS OF DISTRESS
NOTED. WILL CONTINUE TO MONITOR

## 2019-08-20 VITALS — DIASTOLIC BLOOD PRESSURE: 57 MMHG | SYSTOLIC BLOOD PRESSURE: 104 MMHG

## 2019-08-20 VITALS — SYSTOLIC BLOOD PRESSURE: 121 MMHG | DIASTOLIC BLOOD PRESSURE: 46 MMHG

## 2019-08-20 NOTE — NUR
PT LYING IN BED. CL IN REACH. DENIES NEEDS AT THIS TIME. PT PREFORMING ROM
WITH LEFT LEG IN BED. TOLERATING WELL. A/O X4. RESP EVEN AND UNLABORED. WCTM

## 2019-08-21 VITALS — SYSTOLIC BLOOD PRESSURE: 117 MMHG | DIASTOLIC BLOOD PRESSURE: 41 MMHG

## 2019-08-21 VITALS — DIASTOLIC BLOOD PRESSURE: 49 MMHG | SYSTOLIC BLOOD PRESSURE: 100 MMHG

## 2019-08-21 LAB
ANION GAP SERPL CALC-SCNC: 10.4 MMOL/L (ref 8–16)
BASOPHILS NFR BLD AUTO: 0.3 % (ref 0–2)
BUN SERPL-MCNC: 38 MG/DL (ref 7–18)
CALCIUM SERPL-MCNC: 8.1 MG/DL (ref 8.5–10.1)
CHLORIDE SERPL-SCNC: 102 MMOL/L (ref 98–107)
CO2 SERPL-SCNC: 29.3 MMOL/L (ref 21–32)
CREAT SERPL-MCNC: 1.9 MG/DL (ref 0.6–1.3)
EOSINOPHIL NFR BLD: 1.6 % (ref 0–7)
ERYTHROCYTE [DISTWIDTH] IN BLOOD BY AUTOMATED COUNT: 13.9 % (ref 11.5–14.5)
GLUCOSE SERPL-MCNC: 111 MG/DL (ref 74–106)
HCT VFR BLD CALC: 29.9 % (ref 36–48)
HGB BLD-MCNC: 9.7 G/DL (ref 12–16)
IMM GRANULOCYTES NFR BLD: 2.1 % (ref 0–5)
LYMPHOCYTES NFR BLD AUTO: 27 % (ref 15–50)
MCH RBC QN AUTO: 28.8 PG (ref 26–34)
MCHC RBC AUTO-ENTMCNC: 32.4 G/DL (ref 31–37)
MCV RBC: 88.7 FL (ref 80–100)
MONOCYTES NFR BLD: 8 % (ref 2–11)
NEUTROPHILS NFR BLD AUTO: 61 % (ref 40–80)
OSMOLALITY SERPL CALC.SUM OF ELEC: 285 MOSM/KG (ref 275–300)
PLATELET # BLD: 371 10X3/UL (ref 130–400)
PMV BLD AUTO: 9.2 FL (ref 7.4–10.4)
POTASSIUM SERPL-SCNC: 3.7 MMOL/L (ref 3.5–5.1)
RBC # BLD AUTO: 3.37 10X6/UL (ref 4–5.4)
SODIUM SERPL-SCNC: 138 MMOL/L (ref 136–145)
WBC # BLD AUTO: 7.7 10X3/UL (ref 4.8–10.8)

## 2019-08-21 NOTE — NUR
PATIENT IS ALERT/ORIENT. IN REHAB ROOM. WORKING WITH PHYSICAL THERAPIST. WILL
CONTINUE WITH PLAN OF CARE

## 2019-08-21 NOTE — NUR
Nutrition Follow-up:
Diet: Regular
PO intake: 16% avg. x last 6 meals
Reports poor appetite. Does not like "hospital food." Is ordering Boost from
menu and drinking about 3 per day.
+BM, labs and meds reviewed
Encouraged her to write in food preferences on menu.
RD Following

## 2019-08-21 NOTE — RHP
PATIENT: MARTELL WATSON                                     MEDICAL RECORD: [id number]
ACCOUNT: [id number]                                    LOCATION:[facility]1118
: [birth year]                                            ADMISSION DATE: 19
                                                         
 
                     REHABILITATION HISTORY AND PHYSICAL EXAMINATION
                         POST ADMISSION PHYSICIAN EXAMINATION
 
 
DATE OF ADMISSION:  2019.
 
ADMITTING DIAGNOSIS:  Under orthopedic classifications.
 
HISTORY OF PRESENT ILLNESS:  The patient is a 73-year-old female patient who
presented to the ED while she was standing on her bed trying to remove a light
fixture from the ceiling fan when she lost her balance, fell on the floor
twisting her left knee.  CT scan showed a highly comminuted fracture of her
proximal tibia and depression and displacement of the lateral plateau.  There
was extension fracture in the tibial spines with displacement and also posterior
aspect of the medial tibial plateau.  Orthopedic was consulted.  She underwent
an ORIF of her left tibial plateau on 08/10/2019.  She has got a history of
hypertension, COPD, chronic constipation and used to smoke.  The patient is
currently having increased pain and having pain medication adjustments during
her stay.  She is on IV antibiotic therapy and electrolyte protocol, acute blood
loss anemia, impaired mobility, gait disturbance, debility, deconditioning, and
self-care deficits.  These are all barriers to her discharge home safely at this
time.  Lives at home alone, was independent with her mobility and ADLs prior to
this.  She is currently set up for max assist for ADLs and max assist to total
assist for mobility secondary to increased pain.  She would like to return home
with some friends here from out of town that will assist her and have home
health set up after her inpatient stay.
 
COMORBIDITIES:  Include acute fall at home, acute left tibial plateau fracture,
status post left tibial plateau ORIF, acute blood loss anemia, hypertension,
hard of hearing, COPD, acid reflux, chronic constipation, history of tobacco
use.
 
PAST MEDICAL HISTORY:  Significant for neuropathy, tremors, has got hard of
hearing, hypertension, coronary artery disease, COPD, acid reflux, constipation,
and tobacco use in the past.
 
PAST SURGICAL HISTORY:  Includes gallbladder surgery, appendectomy,
hysterectomy, and angioplasty with stents.
 
ALLERGIES:  PLAVIX AND SOME ANTIBIOTIC THAT SHE IS UNSURE OF AT THIS TIME.
 
CURRENT MEDICATIONS:  Include Cozaar 100 mg daily.  She is on Chlorthalidone 50
mg daily.  She is on aspirin chewable 81 mg daily, Norvasc 10 mg daily, Protonix
40 mg daily, Synthroid 75 mcg daily, Pravachol 80 mg q.h.s., Mirapex 3 mg
q.h.s., polyethylene glycol 17 grams in 8 ounces of water daily, Wellbutrin 75
b.i.d., Eliquis 2.5 mg b.i.d., Norco 7.5 one tab every 4 hours p.r.n., and
MiraLax 17 grams in 8 ounces of water daily.
 
HABITS:  No current alcohol or tobacco use.  She does have a history of tobacco
use in the past.
 
FAMILY HISTORY:  Noncontributory.
 
 
 
 
HISTORY AND PHYSICAL                           [id number]    MARTELL WATSON             
 
 
SOCIAL HISTORY:  Once again, the patient hopes to go home.  She has got strong
friend support to help her with this.
 
REVIEW OF SYSTEMS:
GENERAL:  Does complain of some weakness and fatigue.
HEENT:  Denies cold, cough, or congestion.
CARDIOVASCULAR:  Denies chest pain.
 
PHYSICAL EXAMINATION:
VITAL SIGNS:  Stable, afebrile.
GENERAL:  A well-developed female in no acute distress, alert upon exam.
HEENT:  Normocephalic and atraumatic.  Mucosa moist.
NECK:  Supple, with no lymphadenopathy.
LUNGS:  Clear in upper fields.
HEART:  Regular rate and rhythm.  No murmurs, rubs or gallops.
ABDOMEN:  Soft, benign, and nondistended.  Positive bowel sounds times 4.
EXTREMITIES:  No clubbing, cyanosis, but her postop area looks pretty good.
NEUROLOGIC:  She is intact.  She has good muscular strength in her proximal
muscles of her thighs.
 
LABORATORY DATA:  White count is 8.3, H&H of 9.2 and 27.2 and platelet count is
242.  Sodium is 144, potassium 3.6, BUN and creatinine of 16 and 1.3, and blood
sugar is noted to be 96.
 
ASSESSMENT:  This is a 73-year-old female patient admitted to the rehab with a
working diagnosis under the orthopedic group secondary to a tibial plateau
fracture and status post open reduction internal fixation.  The patient has
potential to make improvement.  We instituted the following multidisciplinary
therapies including but not limited to physical, occupational, respiratory,
speech, nutritional services, prosthetics and orthotics.  Given her complex
medical condition and risks for more complications, rehabilitation services
cannot be provided at a low level of care such as skilled nursing facility.
 
PLAN:
1.  Admit to [facility] for an intensive inpatient
therapy to include the following disciplines:
A.  Physical therapy to improve gait, all transfer skills and bed mobility to a
modified independent level.
B.  Occupational therapy to improve activities of daily living to a modified
independent level.
C.  Case management to assist with discharge planning and placement options.
D.  Nutrition to assist with nutritional needs.
E.  Rehabilitation nursing to assist in monitoring the patient's underlying
medical conditions and to assist with any type of bowel or bladder management.
2.  The patient's current medication and medical care will be continued.
3.  The patient will be placed on standard fall precautions.
4.  The patient's estimated length of stay is approximately 7-10 days.
5.  I will discuss this patient during care team staffing this week.
 
TRANSINT:[id number] Voice Confirmation ID: [id number] DOCUMENT ID: [id number]
Edited 19 for jennie REA. 
 
 
ÁLVARO notes whether there has been none or any medical/functional
change since admission:
 
 
 
HISTORY AND PHYSICAL                           [id number]    MARTELL WATSON since preadmission screen.                                   
 
 
ÁLVARO attests patient continues to be appropriate for IRF:
- Continues to be appropriate.                                          
 
 
                                           
                                           PARKER CHAMBERS MD           
 
 
 
Electronically Signed by PARKER CHAMBERS on 19 at 1141
 
 
 
 
 
 
 
 
 
 
 
 
 
 
 
 
 
 
 
 
 
 
 
 
 
 
 
 
 
 
 
 
 
 
CC:                                                             [id number]
DICTATION DATE: 19 0849     :     19 0919      ADM IN  
                                                                              
[facility]                                          
[address] [place] [address]

## 2019-08-21 NOTE — NUR
PATYIENT RECIEVED SITTING UP IN BED. ASSESSMENT & VITAL SIGNS DONE. BED LOW.
BEDSIDE TABLE & CALL LIGHT WITHIN REACH. WILL CONTINUE TO MONITOR.

## 2019-08-22 VITALS — SYSTOLIC BLOOD PRESSURE: 105 MMHG | DIASTOLIC BLOOD PRESSURE: 40 MMHG

## 2019-08-22 VITALS — DIASTOLIC BLOOD PRESSURE: 47 MMHG | SYSTOLIC BLOOD PRESSURE: 96 MMHG

## 2019-08-22 NOTE — NUR
PATIENT EYES CLOSED. RESPIRATIONS 18 & EVEN. BED LOW. CALL LIGHT WITHIN REACH.
WILL CONTINUE TO MONITOR.

## 2019-08-22 NOTE — NUR
PATIENT RECEIVED SITTING UP IN BED. ASSESSMENT & VITAL SIGNS DONE. NO C/O PAIN
OR DISTRESS. BED LOW. CALL LIGHT WITHIN REACH.

## 2019-08-23 VITALS — SYSTOLIC BLOOD PRESSURE: 116 MMHG | DIASTOLIC BLOOD PRESSURE: 50 MMHG

## 2019-08-23 VITALS — DIASTOLIC BLOOD PRESSURE: 53 MMHG | SYSTOLIC BLOOD PRESSURE: 110 MMHG

## 2019-08-23 LAB
ANION GAP SERPL CALC-SCNC: 14.1 MMOL/L (ref 8–16)
BASOPHILS NFR BLD AUTO: 0.2 % (ref 0–2)
BUN SERPL-MCNC: 47 MG/DL (ref 7–18)
CALCIUM SERPL-MCNC: 8.7 MG/DL (ref 8.5–10.1)
CHLORIDE SERPL-SCNC: 101 MMOL/L (ref 98–107)
CO2 SERPL-SCNC: 28.6 MMOL/L (ref 21–32)
CREAT SERPL-MCNC: 2.3 MG/DL (ref 0.6–1.3)
EOSINOPHIL NFR BLD: 1.1 % (ref 0–7)
ERYTHROCYTE [DISTWIDTH] IN BLOOD BY AUTOMATED COUNT: 14.1 % (ref 11.5–14.5)
GLUCOSE SERPL-MCNC: 106 MG/DL (ref 74–106)
HCT VFR BLD CALC: 32.9 % (ref 36–48)
HGB BLD-MCNC: 10.6 G/DL (ref 12–16)
IMM GRANULOCYTES NFR BLD: 2.2 % (ref 0–5)
LYMPHOCYTES NFR BLD AUTO: 24.7 % (ref 15–50)
MCH RBC QN AUTO: 28.8 PG (ref 26–34)
MCHC RBC AUTO-ENTMCNC: 32.2 G/DL (ref 31–37)
MCV RBC: 89.4 FL (ref 80–100)
MONOCYTES NFR BLD: 6.7 % (ref 2–11)
NEUTROPHILS NFR BLD AUTO: 65.1 % (ref 40–80)
OSMOLALITY SERPL CALC.SUM OF ELEC: 290 MOSM/KG (ref 275–300)
PLATELET # BLD: 435 10X3/UL (ref 130–400)
PMV BLD AUTO: 9.4 FL (ref 7.4–10.4)
POTASSIUM SERPL-SCNC: 3.7 MMOL/L (ref 3.5–5.1)
RBC # BLD AUTO: 3.68 10X6/UL (ref 4–5.4)
SODIUM SERPL-SCNC: 140 MMOL/L (ref 136–145)
WBC # BLD AUTO: 9.5 10X3/UL (ref 4.8–10.8)

## 2019-08-23 NOTE — NUR
RESTING QUIETLY IN BED. HAS LLE ELEVATED ON PILLOWS AND IS WEARING A BRACE TO
LLE. NO S/S DISTRESS OR INCREASED PAIN. CALL LIGHT IN REACH. SIDE RAILS UP X2.
BED IN LOWEST POSITION.

## 2019-08-23 NOTE — NUR
RECEIVED CARE FROM DAY NURSE.  LYING IN BED WATCHING TV.  CALL LIGHT AT SIDE.
REPORTS NO NEEDS AT THIS TIME.  LEFT LEG IN IMOBILIZER.

## 2019-08-23 NOTE — NUR
SITTING UP IN BED FOR BREAKFAST. DENIES NEEDS. APPEARS DROWSY BUT COOPERATIVE.
DENIES N/V. CALL LIGHT IN REACH

## 2019-08-24 VITALS — DIASTOLIC BLOOD PRESSURE: 40 MMHG | SYSTOLIC BLOOD PRESSURE: 106 MMHG

## 2019-08-24 VITALS — SYSTOLIC BLOOD PRESSURE: 130 MMHG | DIASTOLIC BLOOD PRESSURE: 59 MMHG

## 2019-08-24 NOTE — NUR
PT ALERT X 4. BREATH SOUNDS CLEAR BILAT. PT ASSISTED TO BATHROOM AND BACK TO
BED. PT REPORTING NO PAIN AT THIS TIME. BRACE TO LEFT LEG. BED LOW, CALL LIGHT
IN REACH. NO OTHER NEEDS AT THIS TIME.

## 2019-08-25 VITALS — SYSTOLIC BLOOD PRESSURE: 112 MMHG | DIASTOLIC BLOOD PRESSURE: 42 MMHG

## 2019-08-25 VITALS — DIASTOLIC BLOOD PRESSURE: 47 MMHG | SYSTOLIC BLOOD PRESSURE: 118 MMHG

## 2019-08-25 NOTE — NUR
ASSISTED TO AND FROM BATHROOM. BACK IN BED.CL IN REACH. DENIES FURTHER NEEDS.
A/O X4. RESP EVEN AND UNLABORED. IMMOBILIZER TO LEFT LEG. WCTM

## 2019-08-26 VITALS — SYSTOLIC BLOOD PRESSURE: 156 MMHG | DIASTOLIC BLOOD PRESSURE: 51 MMHG

## 2019-08-26 LAB
ANION GAP SERPL CALC-SCNC: 11.9 MMOL/L (ref 8–16)
BASOPHILS NFR BLD AUTO: 0.3 % (ref 0–2)
BUN SERPL-MCNC: 41 MG/DL (ref 7–18)
CALCIUM SERPL-MCNC: 9.1 MG/DL (ref 8.5–10.1)
CHLORIDE SERPL-SCNC: 104 MMOL/L (ref 98–107)
CO2 SERPL-SCNC: 29.4 MMOL/L (ref 21–32)
CREAT SERPL-MCNC: 2.1 MG/DL (ref 0.6–1.3)
EOSINOPHIL NFR BLD: 2.3 % (ref 0–7)
ERYTHROCYTE [DISTWIDTH] IN BLOOD BY AUTOMATED COUNT: 14.2 % (ref 11.5–14.5)
GLUCOSE SERPL-MCNC: 104 MG/DL (ref 74–106)
HCT VFR BLD CALC: 33.2 % (ref 36–48)
HGB BLD-MCNC: 10.7 G/DL (ref 12–16)
IMM GRANULOCYTES NFR BLD: 1.7 % (ref 0–5)
LYMPHOCYTES NFR BLD AUTO: 23.7 % (ref 15–50)
MCH RBC QN AUTO: 28.7 PG (ref 26–34)
MCHC RBC AUTO-ENTMCNC: 32.2 G/DL (ref 31–37)
MCV RBC: 89 FL (ref 80–100)
MONOCYTES NFR BLD: 6.4 % (ref 2–11)
NEUTROPHILS NFR BLD AUTO: 65.6 % (ref 40–80)
OSMOLALITY SERPL CALC.SUM OF ELEC: 290 MOSM/KG (ref 275–300)
PLATELET # BLD: 407 10X3/UL (ref 130–400)
PMV BLD AUTO: 9.6 FL (ref 7.4–10.4)
POTASSIUM SERPL-SCNC: 4.3 MMOL/L (ref 3.5–5.1)
RBC # BLD AUTO: 3.73 10X6/UL (ref 4–5.4)
SODIUM SERPL-SCNC: 141 MMOL/L (ref 136–145)
WBC # BLD AUTO: 11.1 10X3/UL (ref 4.8–10.8)

## 2019-08-26 NOTE — NUR
SPOKE WITH PATIENT THIS AM AND SHE FEELS THAT SHE IS NOT READY TO GO HOME AT
THIS TIME AND WOULD LIKE A REFERRAL TO BE MADE TO [facility] FOR
POSSIBLE ADMISSION. REFERRAL HAS BEEN FAXED . WILL CONTINUE TO FOLLOW WITH
PATIENT.

## 2019-08-26 NOTE — NUR
ASSISTED TO AND FROM BATHROOM. CL IN REACH. BACK IN BED. PT COMPLAINING OF
NECK PAIN. PAIN PILL GIVEN PER MAR. WCTM

## 2019-08-26 NOTE — NUR
LAYING IN BED RESTING AND WATCHING TV. DENIES NEW PROBLEMS OR NEW PAIN. LLE
HAS IMMOBILIZER IN PLACE AND SHE IS NWB. CALL LIGHT IN REACH

## 2019-08-27 VITALS — DIASTOLIC BLOOD PRESSURE: 47 MMHG | SYSTOLIC BLOOD PRESSURE: 116 MMHG

## 2019-08-27 VITALS — SYSTOLIC BLOOD PRESSURE: 112 MMHG | DIASTOLIC BLOOD PRESSURE: 58 MMHG

## 2019-08-27 NOTE — NUR
DC TO [facility]. REPORT CALLED. SHE TOOK ALL HER PERSONAL BELONGINS
WITH HER. [facility] TOOK HER IN .

## 2019-08-27 NOTE — NUR
PATIENT HAS BEEN ACCEPTED TO [facility] AND WILL TRANSFER THERE
VIA FACILITY VAN. NO HOME HEALTH OR DME NEEDED AT THIS TIME.PATIENT CHOICE
FORM AND IMFM FORMS SIGNED, COPY GIVEN TO PATIENT AND FILED IN CHART.
DISCHARGE INSTRUCTIONS WITH FIM DATA FAXED TO SNF, AND REVIEWED WITH PATIENT.
AN APPOINTMENT WITH PATIENT PCP WILL BE MADE WHEN DISCHARGE FROM FACILITY. DR. BATES 8/28/19 @ 12:45.

## 2020-01-23 ENCOUNTER — HOSPITAL ENCOUNTER (OUTPATIENT)
Dept: HOSPITAL 84 - D.US | Age: 75
Discharge: HOME | End: 2020-01-23
Attending: ORTHOPAEDIC SURGERY
Payer: MEDICARE

## 2020-01-23 VITALS — BODY MASS INDEX: 26.7 KG/M2

## 2020-01-23 DIAGNOSIS — R22.40: Primary | ICD-10-CM

## 2020-02-24 ENCOUNTER — HOSPITAL ENCOUNTER (EMERGENCY)
Dept: HOSPITAL 84 - D.ER | Age: 75
Discharge: HOME | End: 2020-02-24
Payer: MEDICARE

## 2020-02-24 VITALS — SYSTOLIC BLOOD PRESSURE: 163 MMHG | DIASTOLIC BLOOD PRESSURE: 59 MMHG

## 2020-02-24 VITALS
HEIGHT: 64 IN | BODY MASS INDEX: 27.03 KG/M2 | BODY MASS INDEX: 27.03 KG/M2 | HEIGHT: 64 IN | WEIGHT: 158.33 LBS | WEIGHT: 158.33 LBS

## 2020-02-24 DIAGNOSIS — K21.9: ICD-10-CM

## 2020-02-24 DIAGNOSIS — M25.562: Primary | ICD-10-CM

## 2020-02-24 DIAGNOSIS — G89.29: ICD-10-CM

## 2020-02-24 DIAGNOSIS — I10: ICD-10-CM

## 2020-02-24 DIAGNOSIS — J44.9: ICD-10-CM

## 2020-02-24 DIAGNOSIS — Z86.718: ICD-10-CM

## 2020-04-05 ENCOUNTER — HOSPITAL ENCOUNTER (INPATIENT)
Dept: HOSPITAL 84 - D.ER | Age: 75
LOS: 4 days | Discharge: NURSING, REHAB OR LONG-TERM CARE | DRG: 871 | End: 2020-04-09
Attending: FAMILY MEDICINE | Admitting: FAMILY MEDICINE
Payer: MEDICARE

## 2020-04-05 VITALS — SYSTOLIC BLOOD PRESSURE: 131 MMHG | DIASTOLIC BLOOD PRESSURE: 65 MMHG

## 2020-04-05 VITALS — DIASTOLIC BLOOD PRESSURE: 48 MMHG | SYSTOLIC BLOOD PRESSURE: 103 MMHG

## 2020-04-05 VITALS — WEIGHT: 158 LBS | BODY MASS INDEX: 26.98 KG/M2 | HEIGHT: 64 IN | BODY MASS INDEX: 26.98 KG/M2

## 2020-04-05 VITALS — SYSTOLIC BLOOD PRESSURE: 121 MMHG | DIASTOLIC BLOOD PRESSURE: 45 MMHG

## 2020-04-05 VITALS — DIASTOLIC BLOOD PRESSURE: 78 MMHG | SYSTOLIC BLOOD PRESSURE: 107 MMHG

## 2020-04-05 DIAGNOSIS — I10: ICD-10-CM

## 2020-04-05 DIAGNOSIS — I25.10: ICD-10-CM

## 2020-04-05 DIAGNOSIS — K21.9: ICD-10-CM

## 2020-04-05 DIAGNOSIS — N17.0: ICD-10-CM

## 2020-04-05 DIAGNOSIS — E87.6: ICD-10-CM

## 2020-04-05 DIAGNOSIS — G25.0: ICD-10-CM

## 2020-04-05 DIAGNOSIS — K59.09: ICD-10-CM

## 2020-04-05 DIAGNOSIS — J44.9: ICD-10-CM

## 2020-04-05 DIAGNOSIS — I21.A1: ICD-10-CM

## 2020-04-05 DIAGNOSIS — D64.9: ICD-10-CM

## 2020-04-05 DIAGNOSIS — A41.9: Primary | ICD-10-CM

## 2020-04-05 DIAGNOSIS — I89.0: ICD-10-CM

## 2020-04-05 DIAGNOSIS — L03.116: ICD-10-CM

## 2020-04-05 LAB
ALBUMIN SERPL-MCNC: 3.2 G/DL (ref 3.4–5)
ALP SERPL-CCNC: 79 U/L (ref 30–120)
ALT SERPL-CCNC: 34 U/L (ref 10–68)
AMPHETAMINES UR QL SCN: NEGATIVE QUAL
ANION GAP SERPL CALC-SCNC: 14.6 MMOL/L (ref 8–16)
BARBITURATES UR QL SCN: NEGATIVE QUAL
BENZODIAZ UR QL SCN: NEGATIVE QUAL
BILIRUB SERPL-MCNC: 1.04 MG/DL (ref 0.2–1.3)
BILIRUB SERPL-MCNC: NEGATIVE MG/DL
BUN SERPL-MCNC: 29 MG/DL (ref 7–18)
BZE UR QL SCN: NEGATIVE QUAL
CALCIUM SERPL-MCNC: 9 MG/DL (ref 8.5–10.1)
CANNABINOIDS UR QL SCN: NEGATIVE QUAL
CHLORIDE SERPL-SCNC: 98 MMOL/L (ref 98–107)
CO2 SERPL-SCNC: 23.8 MMOL/L (ref 21–32)
CREAT SERPL-MCNC: 1.6 MG/DL (ref 0.6–1.3)
CRP SERPL-MCNC: 12.2 MG/DL (ref 0–0.9)
EOSINOPHIL NFR BLD: 1 % (ref 0–7)
ERYTHROCYTE [DISTWIDTH] IN BLOOD BY AUTOMATED COUNT: 14 % (ref 11.5–14.5)
GLOBULIN SER-MCNC: 4.1 G/L
GLUCOSE SERPL-MCNC: 96 MG/DL (ref 74–106)
GLUCOSE SERPL-MCNC: NEGATIVE MG/DL
HCT VFR BLD CALC: 32.9 % (ref 36–48)
HGB BLD-MCNC: 10.2 G/DL (ref 12–16)
KETONES UR STRIP-MCNC: NEGATIVE MG/DL
LYMPHOCYTES NFR BLD AUTO: 6 % (ref 15–50)
MCH RBC QN AUTO: 26.7 PG (ref 26–34)
MCHC RBC AUTO-ENTMCNC: 31 G/DL (ref 31–37)
MCV RBC: 86.1 FL (ref 80–100)
MONOCYTES NFR BLD: 5 % (ref 2–11)
NEUTROPHILS NFR BLD AUTO: 86 % (ref 40–80)
NITRITE UR-MCNC: NEGATIVE MG/ML
NT-PROBNP SERPL-MCNC: 983 PG/ML (ref 0–125)
OPIATES UR QL SCN: NEGATIVE QUAL
OSMOLALITY SERPL CALC.SUM OF ELEC: 271 MOSM/KG (ref 275–300)
PCP UR QL SCN: NEGATIVE QUAL
PH UR STRIP: 5 [PH] (ref 5–6)
PLATELET # BLD EST: NORMAL 10*3/UL
PLATELET # BLD: 217 10X3/UL (ref 130–400)
PMV BLD AUTO: 9.6 FL (ref 7.4–10.4)
POTASSIUM SERPL-SCNC: 3.4 MMOL/L (ref 3.5–5.1)
PROT SERPL-MCNC: 7.3 G/DL (ref 6.4–8.2)
RBC # BLD AUTO: 3.82 10X6/UL (ref 4–5.4)
SODIUM SERPL-SCNC: 133 MMOL/L (ref 136–145)
SP GR UR STRIP: 1.01 (ref 1–1.02)
TROPONIN I SERPL-MCNC: 0.1 NG/ML (ref 0–0.06)
UROBILINOGEN UR-MCNC: NORMAL MG/DL
WBC # BLD AUTO: 21.7 10X3/UL (ref 4.8–10.8)

## 2020-04-05 NOTE — NUR
LYING QUEITLY WITH EYES CLOSED. AROUSES TO VERBAL STIMULI.LLE RED AND WARM TO
TOUCH.WITH EDEMA NOTED. SL TO LFA INTACT WITHOUT REDNESS OR EDEMA NOTED. CL IN
REACH

## 2020-04-06 VITALS — DIASTOLIC BLOOD PRESSURE: 40 MMHG | SYSTOLIC BLOOD PRESSURE: 99 MMHG

## 2020-04-06 VITALS — DIASTOLIC BLOOD PRESSURE: 40 MMHG | SYSTOLIC BLOOD PRESSURE: 96 MMHG

## 2020-04-06 VITALS — SYSTOLIC BLOOD PRESSURE: 91 MMHG | DIASTOLIC BLOOD PRESSURE: 39 MMHG

## 2020-04-06 VITALS — DIASTOLIC BLOOD PRESSURE: 40 MMHG | SYSTOLIC BLOOD PRESSURE: 87 MMHG

## 2020-04-06 VITALS — SYSTOLIC BLOOD PRESSURE: 100 MMHG | DIASTOLIC BLOOD PRESSURE: 51 MMHG

## 2020-04-06 VITALS — SYSTOLIC BLOOD PRESSURE: 98 MMHG | DIASTOLIC BLOOD PRESSURE: 39 MMHG

## 2020-04-06 LAB
ALBUMIN SERPL-MCNC: 2.4 G/DL (ref 3.4–5)
ALP SERPL-CCNC: 87 U/L (ref 30–120)
ALT SERPL-CCNC: 29 U/L (ref 10–68)
ANION GAP SERPL CALC-SCNC: 7.7 MMOL/L (ref 8–16)
BASOPHILS NFR BLD AUTO: 0.1 % (ref 0–2)
BILIRUB SERPL-MCNC: 0.84 MG/DL (ref 0.2–1.3)
BUN SERPL-MCNC: 28 MG/DL (ref 7–18)
CALCIUM SERPL-MCNC: 8.2 MG/DL (ref 8.5–10.1)
CHLORIDE SERPL-SCNC: 102 MMOL/L (ref 98–107)
CO2 SERPL-SCNC: 30.2 MMOL/L (ref 21–32)
CREAT SERPL-MCNC: 1.8 MG/DL (ref 0.6–1.3)
EOSINOPHIL NFR BLD: 0.1 % (ref 0–7)
ERYTHROCYTE [DISTWIDTH] IN BLOOD BY AUTOMATED COUNT: 14.1 % (ref 11.5–14.5)
GLOBULIN SER-MCNC: 3.7 G/L
GLUCOSE SERPL-MCNC: 98 MG/DL (ref 74–106)
HCT VFR BLD CALC: 27.2 % (ref 36–48)
HGB BLD-MCNC: 8.2 G/DL (ref 12–16)
IMM GRANULOCYTES NFR BLD: 0.3 % (ref 0–5)
LYMPHOCYTES NFR BLD AUTO: 6.2 % (ref 15–50)
MCH RBC QN AUTO: 26.1 PG (ref 26–34)
MCHC RBC AUTO-ENTMCNC: 30.1 G/DL (ref 31–37)
MCV RBC: 86.6 FL (ref 80–100)
MONOCYTES NFR BLD: 6.8 % (ref 2–11)
NEUTROPHILS NFR BLD AUTO: 86.5 % (ref 40–80)
OSMOLALITY SERPL CALC.SUM OF ELEC: 277 MOSM/KG (ref 275–300)
PLATELET # BLD: 211 10X3/UL (ref 130–400)
PMV BLD AUTO: 10.2 FL (ref 7.4–10.4)
POTASSIUM SERPL-SCNC: 3.9 MMOL/L (ref 3.5–5.1)
PROT SERPL-MCNC: 6.1 G/DL (ref 6.4–8.2)
RBC # BLD AUTO: 3.14 10X6/UL (ref 4–5.4)
SODIUM SERPL-SCNC: 136 MMOL/L (ref 136–145)
WBC # BLD AUTO: 13.6 10X3/UL (ref 4.8–10.8)

## 2020-04-06 NOTE — NUR
PT LYING IN BED, CL IN REACH, PT LT LEG BELOW KNEE IS RED, WARM TO TOUCH, PT
PENDING VISIT FROM DOCTOR THIS MORNING TO SEE IF SURGERY NEEDS TO BE DONE, NO
S/SX OF DISTRESS, CL IN REACH ASSUME PT CARE

## 2020-04-06 NOTE — NUR
ALERT RESTING IN BED, REPORTS PAIN TO LEFT KNNE WITH MOVEMENT, SEE SHIFT
ASSESSMENT, CALL LIGHT IN REACH

## 2020-04-07 VITALS — SYSTOLIC BLOOD PRESSURE: 106 MMHG | DIASTOLIC BLOOD PRESSURE: 46 MMHG

## 2020-04-07 VITALS — SYSTOLIC BLOOD PRESSURE: 84 MMHG | DIASTOLIC BLOOD PRESSURE: 43 MMHG

## 2020-04-07 VITALS — DIASTOLIC BLOOD PRESSURE: 60 MMHG | SYSTOLIC BLOOD PRESSURE: 118 MMHG

## 2020-04-07 VITALS — DIASTOLIC BLOOD PRESSURE: 42 MMHG | SYSTOLIC BLOOD PRESSURE: 126 MMHG

## 2020-04-07 VITALS — SYSTOLIC BLOOD PRESSURE: 91 MMHG | DIASTOLIC BLOOD PRESSURE: 36 MMHG

## 2020-04-07 VITALS — DIASTOLIC BLOOD PRESSURE: 40 MMHG | SYSTOLIC BLOOD PRESSURE: 99 MMHG

## 2020-04-07 LAB
ALBUMIN SERPL-MCNC: 2.4 G/DL (ref 3.4–5)
ALP SERPL-CCNC: 77 U/L (ref 30–120)
ALT SERPL-CCNC: 25 U/L (ref 10–68)
ANION GAP SERPL CALC-SCNC: 12.1 MMOL/L (ref 8–16)
BASOPHILS NFR BLD AUTO: 0.2 % (ref 0–2)
BILIRUB SERPL-MCNC: 0.7 MG/DL (ref 0.2–1.3)
BUN SERPL-MCNC: 42 MG/DL (ref 7–18)
CALCIUM SERPL-MCNC: 7.9 MG/DL (ref 8.5–10.1)
CHLORIDE SERPL-SCNC: 99 MMOL/L (ref 98–107)
CO2 SERPL-SCNC: 26.5 MMOL/L (ref 21–32)
CREAT SERPL-MCNC: 2.6 MG/DL (ref 0.6–1.3)
EOSINOPHIL NFR BLD: 1.1 % (ref 0–7)
ERYTHROCYTE [DISTWIDTH] IN BLOOD BY AUTOMATED COUNT: 14.3 % (ref 11.5–14.5)
GLOBULIN SER-MCNC: 3.9 G/L
GLUCOSE SERPL-MCNC: 77 MG/DL (ref 74–106)
HCT VFR BLD CALC: 25.8 % (ref 36–48)
HGB BLD-MCNC: 7.7 G/DL (ref 12–16)
IMM GRANULOCYTES NFR BLD: 0.5 % (ref 0–5)
LYMPHOCYTES NFR BLD AUTO: 10.3 % (ref 15–50)
MCH RBC QN AUTO: 26 PG (ref 26–34)
MCHC RBC AUTO-ENTMCNC: 29.8 G/DL (ref 31–37)
MCV RBC: 87.2 FL (ref 80–100)
MONOCYTES NFR BLD: 8.7 % (ref 2–11)
NEUTROPHILS NFR BLD AUTO: 79.2 % (ref 40–80)
OSMOLALITY SERPL CALC.SUM OF ELEC: 277 MOSM/KG (ref 275–300)
PLATELET # BLD: 199 10X3/UL (ref 130–400)
PMV BLD AUTO: 10.3 FL (ref 7.4–10.4)
POTASSIUM SERPL-SCNC: 3.6 MMOL/L (ref 3.5–5.1)
PROT SERPL-MCNC: 6.3 G/DL (ref 6.4–8.2)
RBC # BLD AUTO: 2.96 10X6/UL (ref 4–5.4)
SODIUM SERPL-SCNC: 134 MMOL/L (ref 136–145)
WBC # BLD AUTO: 10.2 10X3/UL (ref 4.8–10.8)

## 2020-04-07 NOTE — NUR
ALERT AND ORIENTED. LUNGS CLEAR BILATERALLY. HEART SOUNDS S1 AND S2 HEARD IN
ALL FIELDS. BOWEL SOUNDS ACTIVE X 4. MILD CELLULITIS NOTED TO LLE. SKIN
OTHERWISE INTACT WITHOUT REDNESS. IV TO LEFT AC PATENT WITHOUT REDNESS. BED
ALARM REFUSAL SIGNED PER PATIENT REQUEST. DENIES NEEDS. BED LOW. CALL URIARTE AND
PERSONAL ITEMS IN REACH. WILL CONTINUE TO MONITOR.

## 2020-04-07 NOTE — NUR
ALERT RESTING IN BED REPORTS PAIN TO LEFT KNEE, STATES MY BP HAS BEEN LOW ALL
DAY SO ILL WAIT ON PAIN MEDS, SEE SHIFT ASSESSMENT, CALL LIGHT IN REACH

## 2020-04-08 VITALS — SYSTOLIC BLOOD PRESSURE: 131 MMHG | DIASTOLIC BLOOD PRESSURE: 74 MMHG

## 2020-04-08 VITALS — DIASTOLIC BLOOD PRESSURE: 33 MMHG | SYSTOLIC BLOOD PRESSURE: 131 MMHG

## 2020-04-08 VITALS — SYSTOLIC BLOOD PRESSURE: 127 MMHG | DIASTOLIC BLOOD PRESSURE: 55 MMHG

## 2020-04-08 VITALS — SYSTOLIC BLOOD PRESSURE: 97 MMHG | DIASTOLIC BLOOD PRESSURE: 45 MMHG

## 2020-04-08 VITALS — SYSTOLIC BLOOD PRESSURE: 89 MMHG | DIASTOLIC BLOOD PRESSURE: 48 MMHG

## 2020-04-08 LAB
ALBUMIN SERPL-MCNC: 2.1 G/DL (ref 3.4–5)
ALP SERPL-CCNC: 135 U/L (ref 30–120)
ALT SERPL-CCNC: 22 U/L (ref 10–68)
ANION GAP SERPL CALC-SCNC: 11.9 MMOL/L (ref 8–16)
BASOPHILS NFR BLD AUTO: 0.3 % (ref 0–2)
BILIRUB SERPL-MCNC: 0.8 MG/DL (ref 0.2–1.3)
BUN SERPL-MCNC: 33 MG/DL (ref 7–18)
CALCIUM SERPL-MCNC: 7.4 MG/DL (ref 8.5–10.1)
CHLORIDE SERPL-SCNC: 100 MMOL/L (ref 98–107)
CO2 SERPL-SCNC: 26 MMOL/L (ref 21–32)
CREAT SERPL-MCNC: 2 MG/DL (ref 0.6–1.3)
EOSINOPHIL NFR BLD: 0.7 % (ref 0–7)
ERYTHROCYTE [DISTWIDTH] IN BLOOD BY AUTOMATED COUNT: 14.2 % (ref 11.5–14.5)
GLOBULIN SER-MCNC: 3.9 G/L
GLUCOSE SERPL-MCNC: 95 MG/DL (ref 74–106)
HCT VFR BLD CALC: 24.5 % (ref 36–48)
HGB BLD-MCNC: 7.5 G/DL (ref 12–16)
IMM GRANULOCYTES NFR BLD: 0.3 % (ref 0–5)
LYMPHOCYTES NFR BLD AUTO: 8.7 % (ref 15–50)
MCH RBC QN AUTO: 26.2 PG (ref 26–34)
MCHC RBC AUTO-ENTMCNC: 30.6 G/DL (ref 31–37)
MCV RBC: 85.7 FL (ref 80–100)
MONOCYTES NFR BLD: 12.1 % (ref 2–11)
NEUTROPHILS NFR BLD AUTO: 77.9 % (ref 40–80)
OSMOLALITY SERPL CALC.SUM OF ELEC: 274 MOSM/KG (ref 275–300)
PLATELET # BLD: 219 10X3/UL (ref 130–400)
PMV BLD AUTO: 10.1 FL (ref 7.4–10.4)
POTASSIUM SERPL-SCNC: 3.9 MMOL/L (ref 3.5–5.1)
PROT SERPL-MCNC: 6 G/DL (ref 6.4–8.2)
RBC # BLD AUTO: 2.86 10X6/UL (ref 4–5.4)
SODIUM SERPL-SCNC: 134 MMOL/L (ref 136–145)
WBC # BLD AUTO: 7.7 10X3/UL (ref 4.8–10.8)

## 2020-04-08 NOTE — NUR
DC IV DUE TO CATHETER TIP COMING OUT OF ARM. TIP INTACT. TOLERATED WELL. WILL
RESTART IV LATER. DENIES ANY NEEDS. WILL CONTINUE TO MONITOR.

## 2020-04-08 NOTE — NUR
ADMINISTERING PRBC. SPIKED BY AGNIESZKA DAS. AT PT BEDSIDE. PUTTING CLEAN LINENS
ON BED. PT IS RESTING COMFORTABLY. DENIES ANY NEEDS AT THIS TIME. WILL
CONTINUE TO MONITOR.

## 2020-04-08 NOTE — NUR
INFORMED PT OF OF BLOOD TRANSFUSION. SHE EXPRESSED CONCERN ABOUT RECEIVING
BLOOD. WILL CONTACT ATTENDING PHYSICIAN TO SPEAK WITH THEM ABOUT CONCERNS.
DENIES ANY OTHER NEEDS AT THIS TIME. WILL CONTINUE TO MONITOR.

## 2020-04-08 NOTE — NUR
RECEIVED PT FROM NIGHT SHIFT. UPON ENTERING PT WAS UPRIGHT IN BED. ALERT AND
ORIENTED X4. LEFT AC IV WITH NS AT 75. PT IS ON ROOM AIR. COMPLAINT OF LEFT
KNEE PAIN. (REBUILT IN AUGUST OF 2019). PT HAS TELEMETRY. DENIES ANY NEEDS AT
THIS TIME. WILL CONTINUE TO MONITOR.

## 2020-04-08 NOTE — NUR
ATTEMPTED TO RESTART IV TWICE. UNABLE TO. WILL ASK ANOTHER NURSE TO TRY.
DENIES ANY NEEDS AT THIS TIME. WILL CONTINUE TO MONITOR.

## 2020-04-08 NOTE — NUR
ADMINISTERED MORNING MEDICATION AT THIS TIME, NO DIFFICULTY SWALLOWING. HELPED
PT ON OFF AT THE BED. SITUATED AND RESTING COMFORTABLY. DENIES ANY OTHER NEEDS
AT THIS TIME. WILL CONTINUE TO MONITOR.

## 2020-04-08 NOTE — NUR
Nutrition follow-up:
Diet: Low sodium
PO intake poor at this time
No BM charted since 4/6
Labs reviewed
Will continue to provide food choices, honor foood preferences and offer
nutritional supplements.
RDN following.

## 2020-04-08 NOTE — NUR
PT SITTING UPRIGHT IN BED UPON ENTERING. LOOKING FOR UPPER DENTURE, ATTEMPTED
TO HELP PT UNABLE TO FIND THEM, REQUESTED I CALL KITCHED. CALLED PROMPTLY, NO
REPORT OF FOUND UPPE DENTURES. ASSESSMENT PERFORMED AT THIS TIME. DENIES ANY
OTHER NEEDS. BED IN LOWEST POSITION, BED RAILS X2, CALL LIGHT WITHIN REACH.
WILL CONTINUE TO MONITOR. 3

## 2020-04-08 NOTE — NUR
PT REPORTS FEELING GOOD DURING BLOOD TRANSFUSION. NO S/S OF REACTION PRESENT.
DENIES ANY NEEDS. WILL CONTINUE TO MONITOR.

## 2020-04-08 NOTE — NUR
NO OBVIOUS REACTIONS IN THE FIRST 15 MINUTES. WILL CONTINUE TO CLOSELY MONITOR
PATIENT. LEFT PT EATING HER DINNER.

## 2020-04-09 ENCOUNTER — HOSPITAL ENCOUNTER (INPATIENT)
Dept: HOSPITAL 84 - D.REHAB | Age: 75
LOS: 12 days | Discharge: NURSING, REHAB OR LONG-TERM CARE | DRG: 91 | End: 2020-04-21
Attending: FAMILY MEDICINE | Admitting: FAMILY MEDICINE
Payer: MEDICARE

## 2020-04-09 VITALS — DIASTOLIC BLOOD PRESSURE: 60 MMHG | SYSTOLIC BLOOD PRESSURE: 158 MMHG

## 2020-04-09 VITALS — SYSTOLIC BLOOD PRESSURE: 116 MMHG | DIASTOLIC BLOOD PRESSURE: 45 MMHG

## 2020-04-09 VITALS — SYSTOLIC BLOOD PRESSURE: 127 MMHG | DIASTOLIC BLOOD PRESSURE: 62 MMHG

## 2020-04-09 VITALS — DIASTOLIC BLOOD PRESSURE: 61 MMHG | SYSTOLIC BLOOD PRESSURE: 162 MMHG

## 2020-04-09 VITALS — SYSTOLIC BLOOD PRESSURE: 157 MMHG | DIASTOLIC BLOOD PRESSURE: 68 MMHG

## 2020-04-09 VITALS
WEIGHT: 156 LBS | BODY MASS INDEX: 26.63 KG/M2 | HEIGHT: 64 IN | BODY MASS INDEX: 26.63 KG/M2 | HEIGHT: 64 IN | WEIGHT: 156 LBS | BODY MASS INDEX: 26.63 KG/M2

## 2020-04-09 VITALS — DIASTOLIC BLOOD PRESSURE: 64 MMHG | SYSTOLIC BLOOD PRESSURE: 128 MMHG

## 2020-04-09 DIAGNOSIS — S82.142A: ICD-10-CM

## 2020-04-09 DIAGNOSIS — G72.89: Primary | ICD-10-CM

## 2020-04-09 DIAGNOSIS — K21.9: ICD-10-CM

## 2020-04-09 DIAGNOSIS — D64.9: ICD-10-CM

## 2020-04-09 DIAGNOSIS — I10: ICD-10-CM

## 2020-04-09 DIAGNOSIS — I89.0: ICD-10-CM

## 2020-04-09 DIAGNOSIS — I21.A1: ICD-10-CM

## 2020-04-09 DIAGNOSIS — N17.9: ICD-10-CM
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
## 2020-04-09 DIAGNOSIS — L03.116: ICD-10-CM

## 2020-04-09 DIAGNOSIS — J44.1: ICD-10-CM

## 2020-04-09 DIAGNOSIS — I25.10: ICD-10-CM

## 2020-04-09 DIAGNOSIS — E87.6: ICD-10-CM

## 2020-04-09 LAB
ALBUMIN SERPL-MCNC: 2.2 G/DL (ref 3.4–5)
ALP SERPL-CCNC: 204 U/L (ref 30–120)
ALT SERPL-CCNC: 23 U/L (ref 10–68)
ANION GAP SERPL CALC-SCNC: 12.1 MMOL/L (ref 8–16)
BASOPHILS NFR BLD AUTO: 0.2 % (ref 0–2)
BILIRUB SERPL-MCNC: 0.84 MG/DL (ref 0.2–1.3)
BUN SERPL-MCNC: 21 MG/DL (ref 7–18)
CALCIUM SERPL-MCNC: 8.4 MG/DL (ref 8.5–10.1)
CHLORIDE SERPL-SCNC: 103 MMOL/L (ref 98–107)
CO2 SERPL-SCNC: 26.8 MMOL/L (ref 21–32)
CREAT SERPL-MCNC: 1.5 MG/DL (ref 0.6–1.3)
EOSINOPHIL NFR BLD: 1.1 % (ref 0–7)
ERYTHROCYTE [DISTWIDTH] IN BLOOD BY AUTOMATED COUNT: 13.9 % (ref 11.5–14.5)
GLOBULIN SER-MCNC: 4.4 G/L
GLUCOSE SERPL-MCNC: 87 MG/DL (ref 74–106)
HCT VFR BLD CALC: 29.5 % (ref 36–48)
HGB BLD-MCNC: 9.1 G/DL (ref 12–16)
IMM GRANULOCYTES NFR BLD: 0.8 % (ref 0–5)
LYMPHOCYTES NFR BLD AUTO: 12.5 % (ref 15–50)
MCH RBC QN AUTO: 26.3 PG (ref 26–34)
MCHC RBC AUTO-ENTMCNC: 30.8 G/DL (ref 31–37)
MCV RBC: 85.3 FL (ref 80–100)
MONOCYTES NFR BLD: 12.8 % (ref 2–11)
NEUTROPHILS NFR BLD AUTO: 72.6 % (ref 40–80)
OSMOLALITY SERPL CALC.SUM OF ELEC: 277 MOSM/KG (ref 275–300)
PLATELET # BLD: 271 10X3/UL (ref 130–400)
PMV BLD AUTO: 9.7 FL (ref 7.4–10.4)
POTASSIUM SERPL-SCNC: 3.9 MMOL/L (ref 3.5–5.1)
PROT SERPL-MCNC: 6.6 G/DL (ref 6.4–8.2)
RBC # BLD AUTO: 3.46 10X6/UL (ref 4–5.4)
SODIUM SERPL-SCNC: 138 MMOL/L (ref 136–145)
WBC # BLD AUTO: 8.5 10X3/UL (ref 4.8–10.8)

## 2020-04-09 NOTE — NUR
ALERT AND ORIENTED, RESTING IN BED WITH EYES OPEN. NO C/O PAIN. NO S/S OF
ACUTE DISTRESS NOTED. WEARS GLASSES. ON TELEMETRY 72 SR. IV TO LEFT HAND, NS
INFUSING @ 50ML/HR. SITE PATENT WITHOUT REDNESS OR SWELLING. ON ELECTROLYTE
PROTOCOL. DENIES ANY NEEDS AT THIS TIME. CALL LIGHT IN REACH. WILL CONTINUE TO
MONITOR.

## 2020-04-09 NOTE — MORECARE
CASE MANAGEMENT DISCHARGE SUMMARY
 
 
PATIENT: MARTELL WATSON                         UNIT: [id number]
ACCOUNT#: [id number]                       ADM DATE: 20
AGE: 74     : [birth year]  SEX: F            ROOM/BED: D.[facility]3    
AUTHOR: TEDDY,DOC                             PHYSICIAN:                               
 
REFERRING PHYSICIAN: ACACIA RICHARDS MD          
DATE OF SERVICE: 20
Discharge Plan
 
 
Patient Name: MARTELL WATSON
Facility: [facility]:[place]
Encounter #: [id number]
Medical Record #: [id number]
: 1945
Planned Disposition: Inpatient Rehab
Anticipated Discharge Date: 20
 
Discharge Date: 
Expected LOS: 3
Initial Reviewer: [id number]
Initial Review Date: 2020
Generated: 20   4:01 pm 
Comments
 
DCP- Discharge Planning
 
Updated by [id number]: Natalia Salmeron on 20   2:01 pm CT
Patient Name: MARTELL WATSON                                     
Admission Status: ER   
Accout number: [id number]                              
Admission Date: 2020   
: 1945                                                        
Admission Diagnosis:SEPSIS, UNSPECIFIED ORGANISM   
Attending: GINNA                                                
Current LOS:  3   
  
Anticipated DC Date: 2020   
Planned Disposition: Inpatient Rehab   
Primary Insurance: MEDICARE A & B   
  
  
Discharge Planning Comments: CM met with patient to discuss discharge 
planning/needs. States she lives alone, but at this time is staying with her 
friend (Nuvia) at [address] [place] [address] in [place]. States she uses a walker 
and wheelchair that she borrowed from [employer]. States she would like to go to 
inpatient rehab at [facility] prior to going home. [facility] for [facility] inpatient rehab 
 
signed. I spoke with Anh in rehab and they will accept her today. I 
informed Dr. Obregon. CM will continue to follow and assist with discharge 
planning/needs.  
  
  
  
  
  
  
: Natalia Salmeron
DCP- Discharge Planning
 
Updated by [id number]: Thuy Ortiz on 20   6:41 pm CT
LATE ENTRY 20  
  
POTTER EXPLAINED AND SIGNED 20 @ 1800
 DCPIA - Discharge Planning Initial Assessment
 
Updated by [id number]: Natalia Salmeron on 20   2:59 pm
*  Is the patient Alert and Oriented?
Yes
*  How many steps to enter\exit or inside your home? 3/0 *  PCP Dr. Richards *  Pharmacy
[facility]
*  Preadmission Environment
Home with Family
*  ADLs
Partial Dependent
*  Partial ADLs (Assistance needed)
Ambulation
*  Equipment
None
*  Other Equipment
Using a walker and a wheelchair borrowed from [employer]
*  List name and contact numbers for known caregivers / representatives who 
currently or will assist patient after discharge:
Nuvia Krishnan  hymimo - [phone]
*  Verbal permission to speak to the caregivers and representatives has been 
obtained from the patient.
Yes
*  Community resources currently utilized
None
*  Additional services required to return to the preadmission environment?
No
*  Can the patient safely return to the preadmission environment?
Yes
*  Has this patient been hospitalized within the prior 30 days at any 
hospital?
No
 
 
 
 
 
 
Coverage Notice
 
Reviewer: [id number] Catherine Ortiz
 
Notice Issued Date-Time: 2020  18:00
Notice Type: Medicare Outpatient Observation Notice
 
Notice Delivered To: Patient
Relationship to Patient: Self
Representative Name: 
 
Delivery Method: HAND - Hand Delivered
Krystle Days:
Prior Verbal Notification: 
 
Recipient Understood Notice: Yes
Recipient Signature: Yes
Med Rec Note Co-signed by Attending:
 
Coverage Notice Comment:  
Patient Name: MARTELL WATSON
Encounter #: [id number]
Page [id number]
 
 
 
 
 
Electronically Signed by REI ESPINAL on 20 at 1501
 
 
 
 
 
 
**All edits/amendments must be made on the electronic document**
 
DICTATION DATE: 20 1501     : ERWIN  20 1501     
RPT#: [id number]                                DC DATE:        
                                               STATUS: ADM IN  
[facility]
[address] [place] [address]
***END OF REPORT***

## 2020-04-09 NOTE — NUR
Rehab Note- Acute Inpatient prescreen order received. The patient has a
pending PT Eval, will await and see the patient's functional mobility at this
time. Will follow at this time. Thank you for this referral!
Anh Cartagena RN
Clinical Liaison, [facility]

## 2020-04-09 NOTE — NUR
I have reviewed this patient and I concur with the Shift Assessment completed
by the Licensed Practical Nurse today this shift. Nutrition Services

## 2020-04-09 NOTE — NUR
DISCHARGED PATIENT TO INPATIENT REHAB. CALLED REPORT TO [facility]. DISCONTINUED IV,
CATHETER TIP INTACT. WENT OVER DISCHARGE INSTRUCTIONS WITH PATIENT, VERBALIZED
UNDERSTANDING. DENIES ANYTHING FURTHER. CNA TOOK PATIENT DOWN VIA WHEELCHAIR.

## 2020-04-10 VITALS — SYSTOLIC BLOOD PRESSURE: 144 MMHG | DIASTOLIC BLOOD PRESSURE: 59 MMHG

## 2020-04-10 VITALS — SYSTOLIC BLOOD PRESSURE: 132 MMHG | DIASTOLIC BLOOD PRESSURE: 59 MMHG

## 2020-04-10 LAB
ANION GAP SERPL CALC-SCNC: 14.4 MMOL/L (ref 8–16)
BASOPHILS NFR BLD AUTO: 0.3 % (ref 0–2)
BUN SERPL-MCNC: 16 MG/DL (ref 7–18)
CALCIUM SERPL-MCNC: 9 MG/DL (ref 8.5–10.1)
CHLORIDE SERPL-SCNC: 101 MMOL/L (ref 98–107)
CO2 SERPL-SCNC: 25.2 MMOL/L (ref 21–32)
CREAT SERPL-MCNC: 1.3 MG/DL (ref 0.6–1.3)
EOSINOPHIL NFR BLD: 1.2 % (ref 0–7)
ERYTHROCYTE [DISTWIDTH] IN BLOOD BY AUTOMATED COUNT: 13.9 % (ref 11.5–14.5)
GLUCOSE SERPL-MCNC: 89 MG/DL (ref 74–106)
HCT VFR BLD CALC: 32.6 % (ref 36–48)
HGB BLD-MCNC: 10.2 G/DL (ref 12–16)
IMM GRANULOCYTES NFR BLD: 1.3 % (ref 0–5)
LYMPHOCYTES NFR BLD AUTO: 12 % (ref 15–50)
MCH RBC QN AUTO: 26.5 PG (ref 26–34)
MCHC RBC AUTO-ENTMCNC: 31.3 G/DL (ref 31–37)
MCV RBC: 84.7 FL (ref 80–100)
MONOCYTES NFR BLD: 8.3 % (ref 2–11)
NEUTROPHILS NFR BLD AUTO: 76.9 % (ref 40–80)
OSMOLALITY SERPL CALC.SUM OF ELEC: 273 MOSM/KG (ref 275–300)
PLATELET # BLD: 367 10X3/UL (ref 130–400)
PMV BLD AUTO: 9.1 FL (ref 7.4–10.4)
POTASSIUM SERPL-SCNC: 3.6 MMOL/L (ref 3.5–5.1)
RBC # BLD AUTO: 3.85 10X6/UL (ref 4–5.4)
SODIUM SERPL-SCNC: 137 MMOL/L (ref 136–145)
WBC # BLD AUTO: 9.2 10X3/UL (ref 4.8–10.8)

## 2020-04-10 NOTE — MORECARE
CASE MANAGEMENT DISCHARGE SUMMARY
 
 
PATIENT: MARTELL WATSON                         UNIT: [id number]
ACCOUNT#: [id number]                       ADM DATE: 20
AGE: 74     : [birth year]  SEX: F            ROOM/BED: D.[facility]3    
AUTHOR: TEDDY,DOC                             PHYSICIAN:                               
 
REFERRING PHYSICIAN: ACACIA RICHARDS MD          
DATE OF SERVICE: 04/10/20
Discharge Plan
 
 
Patient Name: MARTELL WATSON
Facility: [facility]:[place]
Encounter #: [id number]
Medical Record #: [id number]
: 1945
Planned Disposition: Inpatient Rehab
Anticipated Discharge Date: 20
 
Discharge Date: 2020
Expected LOS: 3
Initial Reviewer: [email or web address]
Initial Review Date: 2020
Generated: 4/10/20   5:52 pm 
Comments
 
DCP- Discharge Planning
 
Updated by [id number]: Natalia Salmeron on 20   2:01 pm CT
Patient Name: MARTELL WATSON                                     
Admission Status: ER   
Accout number: [id number]                              
Admission Date: 2020   
: 1945                                                        
Admission Diagnosis:SEPSIS, UNSPECIFIED ORGANISM   
Attending: GINNA                                                
Current LOS:  3   
  
Anticipated DC Date: 2020   
Planned Disposition: Inpatient Rehab   
Primary Insurance: MEDICARE A & B   
  
  
Discharge Planning Comments: CM met with patient to discuss discharge 
planning/needs. States she lives alone, but at this time is staying with her 
friend (Nuvia) at [address] [place] [address] in [place]. States she uses a walker 
and wheelchair that she borrowed from [employer]. States she would like to go to 
inpatient rehab at [facility] prior to going home. [facility]RAY[facility] for [facility] inpatient rehab 
 
signed. I spoke with Anh in rehab and they will accept her today. I 
informed Dr. Obregon. CM will continue to follow and assist with discharge 
planning/needs.  
  
  
  
  
  
  
: Natalia Salmeron
DCP- Discharge Planning
 
Updated by [id number]: Thuy Ortiz on 20   6:41 pm CT
LATE ENTRY 20  
  
POTTER EXPLAINED AND SIGNED 20 @ 1800
 DCPIA - Discharge Planning Initial Assessment
 
Updated by [id number]: Natalia Salmeron on 20   2:59 pm
*  Is the patient Alert and Oriented?
Yes
*  How many steps to enter\exit or inside your home? 3/0 *  PCP Dr. Richards *  Pharmacy
[facility]
*  Preadmission Environment
Home with Family
*  ADLs
Partial Dependent
*  Partial ADLs (Assistance needed)
Ambulation
*  Equipment
None
*  Other Equipment
Using a walker and a wheelchair borrowed from [place]
*  List name and contact numbers for known caregivers / representatives who 
currently or will assist patient after discharge:
Nuvia Krishnan  [email or web address] - [phone]
*  Verbal permission to speak to the caregivers and representatives has been 
obtained from the patient.
Yes
*  Community resources currently utilized
None
*  Additional services required to return to the preadmission environment?
No
*  Can the patient safely return to the preadmission environment?
Yes
*  Has this patient been hospitalized within the prior 30 days at any 
hospital?
No
 
 
 
 
 
 
Coverage Notice
 
Reviewer: [id number] Catherine Ortiz
 
Notice Issued Date-Time: 2020  18:00
Notice Type: Medicare Outpatient Observation Notice
 
Notice Delivered To: Patient
Relationship to Patient: Self
Representative Name: 
 
Delivery Method: HAND - Hand Delivered
Krystle Days:
Prior Verbal Notification: 
 
Recipient Understood Notice: Yes
Recipient Signature: Yes
Med Rec Note Co-signed by Attending:
 
Coverage Notice Comment:  
Reviewer: [email or web address] Catherine Salmeron
 
Notice Issued Date-Time: 2020  15:01
Notice Type: IM Discharge Notice
 
Notice Delivered To: Patient
Relationship to Patient: Self
Representative Name: 
 
Delivery Method: HAND - Hand Delivered
Krystle Days:
Prior Verbal Notification: 
 
Recipient Understood Notice: Yes
Recipient Signature: Yes
Med Rec Note Co-signed by Attending:
 
Coverage Notice Comment:  IMM explained, signed, given, copy placed in MR
Reviewer: [email or web address] Catherine Salmeron
 
Notice Issued Date-Time: 2020  15:01
Notice Type: Patient Choice Letter
 
Notice Delivered To: Patient
Relationship to Patient: Self
Representative Name: 
 
Delivery Method:  - 
Krystle Days:
Prior Verbal Notification: 
 
 
Recipient Understood Notice: 
Recipient Signature: 
Med Rec Note Co-signed by Attending:
 
Coverage Notice Comment:  
 
Last DP export: 20   2:01 pm
Patient Name: MARTELL WATSON
Encounter #: [id number]
Page [id number]
 
 
 
 
 
Electronically Signed by REI ESPINAL on 04/10/20 at 1653
 
 
 
 
 
 
**All edits/amendments must be made on the electronic document**
 
DICTATION DATE: 04/10/20 1652     : ERWIN  04/10/20 1652     
RPT#: [id number]                                DC DATE:20
                                               STATUS: DIS IN  
[facility]
[address] [place] [address]
***END OF REPORT***

## 2020-04-11 VITALS — SYSTOLIC BLOOD PRESSURE: 123 MMHG | DIASTOLIC BLOOD PRESSURE: 53 MMHG

## 2020-04-11 VITALS — SYSTOLIC BLOOD PRESSURE: 108 MMHG | DIASTOLIC BLOOD PRESSURE: 46 MMHG

## 2020-04-12 VITALS — DIASTOLIC BLOOD PRESSURE: 39 MMHG | SYSTOLIC BLOOD PRESSURE: 95 MMHG

## 2020-04-13 VITALS — DIASTOLIC BLOOD PRESSURE: 53 MMHG | SYSTOLIC BLOOD PRESSURE: 135 MMHG

## 2020-04-13 VITALS — SYSTOLIC BLOOD PRESSURE: 120 MMHG | DIASTOLIC BLOOD PRESSURE: 48 MMHG

## 2020-04-13 VITALS — SYSTOLIC BLOOD PRESSURE: 130 MMHG | DIASTOLIC BLOOD PRESSURE: 55 MMHG

## 2020-04-13 LAB
ANION GAP SERPL CALC-SCNC: 9.5 MMOL/L (ref 8–16)
BASOPHILS NFR BLD AUTO: 0.3 % (ref 0–2)
BUN SERPL-MCNC: 19 MG/DL (ref 7–18)
CALCIUM SERPL-MCNC: 8.7 MG/DL (ref 8.5–10.1)
CHLORIDE SERPL-SCNC: 99 MMOL/L (ref 98–107)
CO2 SERPL-SCNC: 30.2 MMOL/L (ref 21–32)
CREAT SERPL-MCNC: 1.4 MG/DL (ref 0.6–1.3)
EOSINOPHIL NFR BLD: 1.6 % (ref 0–7)
ERYTHROCYTE [DISTWIDTH] IN BLOOD BY AUTOMATED COUNT: 14 % (ref 11.5–14.5)
GLUCOSE SERPL-MCNC: 87 MG/DL (ref 74–106)
HCT VFR BLD CALC: 32.9 % (ref 36–48)
HGB BLD-MCNC: 9.9 G/DL (ref 12–16)
IMM GRANULOCYTES NFR BLD: 0.8 % (ref 0–5)
LYMPHOCYTES NFR BLD AUTO: 12.1 % (ref 15–50)
MCH RBC QN AUTO: 26.1 PG (ref 26–34)
MCHC RBC AUTO-ENTMCNC: 30.1 G/DL (ref 31–37)
MCV RBC: 86.6 FL (ref 80–100)
MONOCYTES NFR BLD: 6.8 % (ref 2–11)
NEUTROPHILS NFR BLD AUTO: 78.4 % (ref 40–80)
OSMOLALITY SERPL CALC.SUM OF ELEC: 270 MOSM/KG (ref 275–300)
PLATELET # BLD: 460 10X3/UL (ref 130–400)
PMV BLD AUTO: 8.8 FL (ref 7.4–10.4)
POTASSIUM SERPL-SCNC: 3.7 MMOL/L (ref 3.5–5.1)
RBC # BLD AUTO: 3.8 10X6/UL (ref 4–5.4)
SODIUM SERPL-SCNC: 135 MMOL/L (ref 136–145)
WBC # BLD AUTO: 10.7 10X3/UL (ref 4.8–10.8)

## 2020-04-14 VITALS — DIASTOLIC BLOOD PRESSURE: 43 MMHG | SYSTOLIC BLOOD PRESSURE: 103 MMHG

## 2020-04-14 VITALS — DIASTOLIC BLOOD PRESSURE: 48 MMHG | SYSTOLIC BLOOD PRESSURE: 135 MMHG

## 2020-04-14 VITALS — SYSTOLIC BLOOD PRESSURE: 135 MMHG | DIASTOLIC BLOOD PRESSURE: 48 MMHG

## 2020-04-15 VITALS — DIASTOLIC BLOOD PRESSURE: 41 MMHG | SYSTOLIC BLOOD PRESSURE: 96 MMHG

## 2020-04-15 VITALS — SYSTOLIC BLOOD PRESSURE: 111 MMHG | DIASTOLIC BLOOD PRESSURE: 43 MMHG

## 2020-04-15 LAB
ANION GAP SERPL CALC-SCNC: 9.2 MMOL/L (ref 8–16)
BASOPHILS NFR BLD AUTO: 0.5 % (ref 0–2)
BUN SERPL-MCNC: 27 MG/DL (ref 7–18)
CALCIUM SERPL-MCNC: 8.5 MG/DL (ref 8.5–10.1)
CHLORIDE SERPL-SCNC: 102 MMOL/L (ref 98–107)
CO2 SERPL-SCNC: 31.8 MMOL/L (ref 21–32)
CREAT SERPL-MCNC: 1.5 MG/DL (ref 0.6–1.3)
EOSINOPHIL NFR BLD: 1.8 % (ref 0–7)
ERYTHROCYTE [DISTWIDTH] IN BLOOD BY AUTOMATED COUNT: 13.9 % (ref 11.5–14.5)
GLUCOSE SERPL-MCNC: 95 MG/DL (ref 74–106)
HCT VFR BLD CALC: 31.9 % (ref 36–48)
HGB BLD-MCNC: 9.4 G/DL (ref 12–16)
IMM GRANULOCYTES NFR BLD: 0.6 % (ref 0–5)
LYMPHOCYTES NFR BLD AUTO: 22 % (ref 15–50)
MCH RBC QN AUTO: 25.8 PG (ref 26–34)
MCHC RBC AUTO-ENTMCNC: 29.5 G/DL (ref 31–37)
MCV RBC: 87.4 FL (ref 80–100)
MONOCYTES NFR BLD: 8.9 % (ref 2–11)
NEUTROPHILS NFR BLD AUTO: 66.2 % (ref 40–80)
OSMOLALITY SERPL CALC.SUM OF ELEC: 282 MOSM/KG (ref 275–300)
PLATELET # BLD: 445 10X3/UL (ref 130–400)
PMV BLD AUTO: 8.8 FL (ref 7.4–10.4)
POTASSIUM SERPL-SCNC: 4 MMOL/L (ref 3.5–5.1)
RBC # BLD AUTO: 3.65 10X6/UL (ref 4–5.4)
SODIUM SERPL-SCNC: 139 MMOL/L (ref 136–145)
WBC # BLD AUTO: 8.2 10X3/UL (ref 4.8–10.8)

## 2020-04-16 VITALS — SYSTOLIC BLOOD PRESSURE: 129 MMHG | DIASTOLIC BLOOD PRESSURE: 48 MMHG

## 2020-04-16 VITALS — SYSTOLIC BLOOD PRESSURE: 100 MMHG | DIASTOLIC BLOOD PRESSURE: 47 MMHG

## 2020-04-17 VITALS — SYSTOLIC BLOOD PRESSURE: 113 MMHG | DIASTOLIC BLOOD PRESSURE: 95 MMHG

## 2020-04-17 LAB
ANION GAP SERPL CALC-SCNC: 10.1 MMOL/L (ref 8–16)
BASOPHILS NFR BLD AUTO: 0.5 % (ref 0–2)
BUN SERPL-MCNC: 27 MG/DL (ref 7–18)
CALCIUM SERPL-MCNC: 8.5 MG/DL (ref 8.5–10.1)
CHLORIDE SERPL-SCNC: 102 MMOL/L (ref 98–107)
CO2 SERPL-SCNC: 30.7 MMOL/L (ref 21–32)
CREAT SERPL-MCNC: 1.4 MG/DL (ref 0.6–1.3)
EOSINOPHIL NFR BLD: 2.1 % (ref 0–7)
ERYTHROCYTE [DISTWIDTH] IN BLOOD BY AUTOMATED COUNT: 13.9 % (ref 11.5–14.5)
GLUCOSE SERPL-MCNC: 90 MG/DL (ref 74–106)
HCT VFR BLD CALC: 32.2 % (ref 36–48)
HGB BLD-MCNC: 9.7 G/DL (ref 12–16)
IMM GRANULOCYTES NFR BLD: 0.6 % (ref 0–5)
LYMPHOCYTES NFR BLD AUTO: 24.5 % (ref 15–50)
MCH RBC QN AUTO: 26.2 PG (ref 26–34)
MCHC RBC AUTO-ENTMCNC: 30.1 G/DL (ref 31–37)
MCV RBC: 87 FL (ref 80–100)
MONOCYTES NFR BLD: 9 % (ref 2–11)
NEUTROPHILS NFR BLD AUTO: 63.3 % (ref 40–80)
OSMOLALITY SERPL CALC.SUM OF ELEC: 282 MOSM/KG (ref 275–300)
PLATELET # BLD: 455 10X3/UL (ref 130–400)
PMV BLD AUTO: 8.9 FL (ref 7.4–10.4)
POTASSIUM SERPL-SCNC: 3.8 MMOL/L (ref 3.5–5.1)
RBC # BLD AUTO: 3.7 10X6/UL (ref 4–5.4)
SODIUM SERPL-SCNC: 139 MMOL/L (ref 136–145)
WBC # BLD AUTO: 8.2 10X3/UL (ref 4.8–10.8)

## 2020-04-18 VITALS — DIASTOLIC BLOOD PRESSURE: 43 MMHG | SYSTOLIC BLOOD PRESSURE: 131 MMHG

## 2020-04-18 VITALS — DIASTOLIC BLOOD PRESSURE: 41 MMHG | SYSTOLIC BLOOD PRESSURE: 122 MMHG

## 2020-04-19 VITALS — DIASTOLIC BLOOD PRESSURE: 39 MMHG | SYSTOLIC BLOOD PRESSURE: 101 MMHG

## 2020-04-19 VITALS — DIASTOLIC BLOOD PRESSURE: 56 MMHG | SYSTOLIC BLOOD PRESSURE: 123 MMHG

## 2020-04-19 VITALS — DIASTOLIC BLOOD PRESSURE: 56 MMHG | SYSTOLIC BLOOD PRESSURE: 152 MMHG

## 2020-04-20 VITALS — DIASTOLIC BLOOD PRESSURE: 64 MMHG | SYSTOLIC BLOOD PRESSURE: 123 MMHG

## 2020-04-20 VITALS — SYSTOLIC BLOOD PRESSURE: 98 MMHG | DIASTOLIC BLOOD PRESSURE: 47 MMHG

## 2020-04-20 LAB
ANION GAP SERPL CALC-SCNC: 11.9 MMOL/L (ref 8–16)
BASOPHILS NFR BLD AUTO: 0.7 % (ref 0–2)
BUN SERPL-MCNC: 27 MG/DL (ref 7–18)
CALCIUM SERPL-MCNC: 8.7 MG/DL (ref 8.5–10.1)
CHLORIDE SERPL-SCNC: 102 MMOL/L (ref 98–107)
CO2 SERPL-SCNC: 29.1 MMOL/L (ref 21–32)
CREAT SERPL-MCNC: 1.3 MG/DL (ref 0.6–1.3)
EOSINOPHIL NFR BLD: 1.5 % (ref 0–7)
ERYTHROCYTE [DISTWIDTH] IN BLOOD BY AUTOMATED COUNT: 13.8 % (ref 11.5–14.5)
GLUCOSE SERPL-MCNC: 96 MG/DL (ref 74–106)
HCT VFR BLD CALC: 34.5 % (ref 36–48)
HGB BLD-MCNC: 10.3 G/DL (ref 12–16)
IMM GRANULOCYTES NFR BLD: 0.5 % (ref 0–5)
LYMPHOCYTES NFR BLD AUTO: 30.3 % (ref 15–50)
MCH RBC QN AUTO: 25.9 PG (ref 26–34)
MCHC RBC AUTO-ENTMCNC: 29.9 G/DL (ref 31–37)
MCV RBC: 86.9 FL (ref 80–100)
MONOCYTES NFR BLD: 7.3 % (ref 2–11)
NEUTROPHILS NFR BLD AUTO: 59.7 % (ref 40–80)
OSMOLALITY SERPL CALC.SUM OF ELEC: 282 MOSM/KG (ref 275–300)
PLATELET # BLD: 443 10X3/UL (ref 130–400)
PMV BLD AUTO: 8.9 FL (ref 7.4–10.4)
POTASSIUM SERPL-SCNC: 4 MMOL/L (ref 3.5–5.1)
RBC # BLD AUTO: 3.97 10X6/UL (ref 4–5.4)
SODIUM SERPL-SCNC: 139 MMOL/L (ref 136–145)
WBC # BLD AUTO: 7.4 10X3/UL (ref 4.8–10.8)

## 2020-04-21 VITALS — DIASTOLIC BLOOD PRESSURE: 51 MMHG | SYSTOLIC BLOOD PRESSURE: 115 MMHG

## 2020-04-21 NOTE — RHP
PATIENT: MARTELL WATSON                                     MEDICAL RECORD: [id number]
ACCOUNT: [id number]                                    LOCATION:[facility]1116
: [birth year]                                            ADMISSION DATE: 20
                                                         
 
                     REHABILITATION HISTORY AND PHYSICAL EXAMINATION
                         POST ADMISSION PHYSICIAN EXAMINATION
 
 
HISTORY OF PRESENT ILLNESS:  The patient is a 74-year-old female patient
admitted secondary to disuse myopathy.  She arrived in the Emergency Room via
ambulance after worsening left knee pain.  She had had surgery in August. 
Currently, the pain was unable to bear any weight on it or ambulate.  She had a
traumatic injury in August of last year for which knee surgery was done after a
fall.  She had been having chronic pain since then.  The patient had orthopedic
surgery consult, was found to have severe cellulitis of the left lower extremity
and recommended IV therapy of vancomycin and Zosyn.  The patient had to have
renal dosing.  She was followed throughout her stay.  She does need a knee
replacement at some point.  Also, noted to have severe lymphedema and goes to a
clinic for treatment.  She has been having PT and OT during her stay.  She needs
to be monitored closely for pain control, electrolyte protocol.  Her H&H has
been low, so we need to watch her healing cellulitis.  She has got electrolyte
abnormalities, balance deficits, decreased activity tolerance, decreased range
of motion, decreased strength, unsteady gait and balance, fatigues easily,
inability to care for herself and these are all reasons that she cannot be
discharged home at this time.  She lives at home with a friend since her knee
surgery in 2019.  The patient is currently set up for max assist for
ADLs and mod assist for mobility.  She plans to be able to return home at her
prior level of functioning or better.
 
COMORBIDITIES:  Include left lower extremity cellulitis, sepsis, normocytic
anemia, thrombosis, acute kidney injury, benign tumors, chronic constipation and
hard of hearing.
 
PAST MEDICAL HISTORY:  Significant for numbness, benign tumors, COPD, acid
reflux, constipation, tobacco use.
 
PAST SURGICAL HISTORY:  Includes gallbladder, appendectomy, hysterectomy, left
knee surgery.
 
ALLERGIES:  COUMADIN, PLAVIX AND AN ANTIBIOTIC.
 
CURRENT MEDICATIONS:  Include losartan 100 mg daily.  She is on chlorthalidone
50 mg daily.  She is on amlodipine 10 mg daily, Protonix 40 mg daily, Synthroid
75 mcg daily, Colace 100 mg b.i.d., Pravachol 80 mg at bedtime, Mirapex 3 mg at
bedtime, Eliquis 2.5 mg b.i.d., Percocet 10/325 one tab every 4 hours p.r.n.,
and MiraLax 17 grams in 8 ounces of water daily.
 
HABITS:  Does have a history of tobacco use.
 
FAMILY HISTORY:  Noncontributory.
 
SOCIAL HISTORY:  The patient hopes to return back home and get back to her prior
level of functioning.
 
REVIEW OF SYSTEMS:
GENERAL:  Does complain of some weakness and fatigue.
HEENT:  Denies cold, cough, or congestion.
 
 
 
HISTORY AND PHYSICAL                           [id number]    MARTELL WATSON             
 
 
CARDIOVASCULAR:  Denies chest pain.
 
PHYSICAL EXAMINATION:
VITAL SIGNS:  Stable, afebrile.
GENERAL:  An elderly female in no acute distress, alert upon exam.
HEENT:  Normocephalic and atraumatic.  Mucosa moist.
NECK:  Supple.  No lymphadenopathy.
LUNGS:  Clear at this time.
HEART:  Regular rate and rhythm.
ABDOMEN:  Soft, benign and nondistended.  Positive bowel sounds times 4.
EXTREMITIES:  No clubbing or cyanosis.  She does have some lymphedema noted and
she has some normal postop swelling.
NEUROLOGIC:  She does have proximal muscle weakness.
 
LABORATORY DATA:  Her white count is 9.2, H&H of 10 and 32 and platelet count is
367.  Sodium 137, potassium 3.6, BUN and creatinine of 16 and 1.3 and blood
sugar is noted to be 89.
 
ASSESSMENT:  This is a 74-year-old female patient admitted to rehab with a
working diagnosis of disuse myopathy.  The patient has potential to make
improvement.  We instituted the following multidisciplinary therapies including,
but not limited to physical, occupational, respiratory, speech, nutritional
services, prosthetics and orthotics.  Given her complex medical condition and
risks for more complications, rehabilitation services cannot be provided at a
low level of care such as skilled nursing facility.
 
PLAN:
1.  Admit to [facility] for intensive inpatient therapy to include the
following disciplines;
A.  Physical therapy to improve gait, all transfer skills and bed mobility to a
modified independent level.
B.  Occupational therapy to improve activities of daily level.
C.  Case management to assist with discharge planning and placement options.
D.  Nutrition to assist with nutritional needs.
E.  Rehabilitation nursing to assist in monitoring the patient's underlying
medical conditions and to assist with any type of bowel or bladder management.
2.  The patient's current medication and medical care will be continued.
3.  The patient will be placed on standard fall precautions.
4.  The patient's estimated length of stay is approximately 7-10 days.
5.  We will discuss this patient during care team staff meeting this week.
 
TRANSINT:[id number] Voice Confirmation ID: [id number] DOCUMENT ID: [id number]
 
 
ÁLVARO notes whether there has been none or any medical/functional
change since admission:
- No change since the PAS                                               
 
 
ÁLVARO attests patient continues to be appropriate for IRF:
- Remains appropriate for the ARU                                       
 
 
 
 
 
HISTORY AND PHYSICAL                           [id number]    MARTELL WATSON,PARKER BAIN MD           
 
 
 
Electronically Signed by PARKER CHAMBERS on 20 at 1447
 
 
 
 
 
 
 
 
 
 
 
 
 
 
 
 
 
 
 
 
 
 
 
 
 
 
 
 
 
 
 
 
 
 
 
 
 
 
 
 
 
CC:                                                             [id number]
DICTATION DATE: 04/10/20 0917     :     04/10/20 1537      ADM IN  
                                                                              
[id number] [place] [address]

## 2020-04-29 NOTE — NUR
LAYING DOWN IN BED PLAYING ON PHONE. DENIES NEEDS OR C/O. HAS LLE BRACE ON.
DENIES N/V OR DIARRHEA. None known declines

## 2020-05-21 ENCOUNTER — HOSPITAL ENCOUNTER (OUTPATIENT)
Dept: HOSPITAL 84 - D.LABREF | Age: 75
Discharge: HOME | End: 2020-05-21
Attending: ORTHOPAEDIC SURGERY
Payer: MEDICARE

## 2020-05-21 VITALS — BODY MASS INDEX: 26.7 KG/M2

## 2020-05-21 DIAGNOSIS — M17.12: Primary | ICD-10-CM

## 2020-05-26 ENCOUNTER — HOSPITAL ENCOUNTER (INPATIENT)
Dept: HOSPITAL 84 - D.SDCHOLD | Age: 75
LOS: 14 days | Discharge: HOME | DRG: 948 | End: 2020-06-09
Attending: ORTHOPAEDIC SURGERY | Admitting: ORTHOPAEDIC SURGERY
Payer: MEDICARE

## 2020-05-26 VITALS — BODY MASS INDEX: 27.2 KG/M2 | HEIGHT: 64 IN | BODY MASS INDEX: 27.2 KG/M2 | WEIGHT: 159.33 LBS

## 2020-05-26 DIAGNOSIS — I73.9: ICD-10-CM

## 2020-05-26 DIAGNOSIS — F17.200: ICD-10-CM

## 2020-05-26 DIAGNOSIS — J44.9: ICD-10-CM

## 2020-05-26 DIAGNOSIS — R60.0: Primary | ICD-10-CM

## 2020-05-26 DIAGNOSIS — Z53.9: ICD-10-CM

## 2020-06-03 LAB
ANION GAP SERPL CALC-SCNC: 11.3 MMOL/L (ref 8–16)
APTT BLD: 25.9 SECONDS (ref 22.8–39.4)
BACTERIA #/AREA URNS HPF: (no result) /HPF
BASOPHILS NFR BLD AUTO: 0.2 % (ref 0–2)
BILIRUB SERPL-MCNC: NEGATIVE MG/DL
BUN SERPL-MCNC: 23 MG/DL (ref 7–18)
CALCIUM SERPL-MCNC: 9.2 MG/DL (ref 8.5–10.1)
CHLORIDE SERPL-SCNC: 101 MMOL/L (ref 98–107)
CO2 SERPL-SCNC: 30.1 MMOL/L (ref 21–32)
CREAT SERPL-MCNC: 1.6 MG/DL (ref 0.6–1.3)
EOSINOPHIL NFR BLD: 0.8 % (ref 0–7)
ERYTHROCYTE [DISTWIDTH] IN BLOOD BY AUTOMATED COUNT: 15.8 % (ref 11.5–14.5)
GLUCOSE SERPL-MCNC: 108 MG/DL (ref 74–106)
GLUCOSE SERPL-MCNC: NEGATIVE MG/DL
HCT VFR BLD CALC: 37.6 % (ref 36–48)
HGB BLD-MCNC: 11.8 G/DL (ref 12–16)
HYALINE CASTS #/AREA URNS LPF: (no result) /LPF
IMM GRANULOCYTES NFR BLD: 0.1 % (ref 0–5)
INR PPP: 0.95 (ref 0.85–1.17)
KETONES UR STRIP-MCNC: NEGATIVE MG/DL
LYMPHOCYTES NFR BLD AUTO: 21.1 % (ref 15–50)
MCH RBC QN AUTO: 26.9 PG (ref 26–34)
MCHC RBC AUTO-ENTMCNC: 31.4 G/DL (ref 31–37)
MCV RBC: 85.8 FL (ref 80–100)
MONOCYTES NFR BLD: 6.5 % (ref 2–11)
NEUTROPHILS NFR BLD AUTO: 71.3 % (ref 40–80)
NITRITE UR-MCNC: NEGATIVE MG/ML
OSMOLALITY SERPL CALC.SUM OF ELEC: 280 MOSM/KG (ref 275–300)
PH UR STRIP: 6 [PH] (ref 5–6)
PLATELET # BLD: 253 10X3/UL (ref 130–400)
PMV BLD AUTO: 9.4 FL (ref 7.4–10.4)
POTASSIUM SERPL-SCNC: 4.4 MMOL/L (ref 3.5–5.1)
PROTHROMBIN TIME: 12.6 SECONDS (ref 11.6–15)
RBC # BLD AUTO: 4.38 10X6/UL (ref 4–5.4)
RBC #/AREA URNS HPF: (no result) /HPF (ref 0–5)
SODIUM SERPL-SCNC: 138 MMOL/L (ref 136–145)
SP GR UR STRIP: 1.01 (ref 1–1.02)
SQUAMOUS #/AREA URNS HPF: (no result) /HPF (ref 0–5)
UROBILINOGEN UR-MCNC: 1 MG/DL
WBC # BLD AUTO: 9.3 10X3/UL (ref 4.8–10.8)
WBC #/AREA URNS HPF: (no result) /HPF

## 2020-06-09 VITALS — SYSTOLIC BLOOD PRESSURE: 145 MMHG | DIASTOLIC BLOOD PRESSURE: 54 MMHG

## 2020-06-09 NOTE — NUR
1100-dr bradley here to rama patient for surgery.  informed of
increased pain and new pain in right leg.  notified of swelling and
redness.  made visible for inspection.

## 2020-06-09 NOTE — NUR
PT FAMILY AT BEDSIDE,EDUCATED ON FOLLOW UP APPT IN 2 WEEKS. PT AND FAMILY
VERBALIZE UNDERSTANDING. PT IV REMOVED.